# Patient Record
Sex: MALE | Race: WHITE | NOT HISPANIC OR LATINO | Employment: FULL TIME | URBAN - METROPOLITAN AREA
[De-identification: names, ages, dates, MRNs, and addresses within clinical notes are randomized per-mention and may not be internally consistent; named-entity substitution may affect disease eponyms.]

---

## 2017-04-03 ENCOUNTER — ALLSCRIPTS OFFICE VISIT (OUTPATIENT)
Dept: OTHER | Facility: OTHER | Age: 47
End: 2017-04-03

## 2017-09-11 ENCOUNTER — ALLSCRIPTS OFFICE VISIT (OUTPATIENT)
Dept: OTHER | Facility: OTHER | Age: 47
End: 2017-09-11

## 2017-12-13 ENCOUNTER — ALLSCRIPTS OFFICE VISIT (OUTPATIENT)
Dept: OTHER | Facility: OTHER | Age: 47
End: 2017-12-13

## 2017-12-15 ENCOUNTER — ALLSCRIPTS OFFICE VISIT (OUTPATIENT)
Dept: OTHER | Facility: OTHER | Age: 47
End: 2017-12-15

## 2017-12-18 NOTE — PROGRESS NOTES
Assessment  1  Periorbital cellulitis of left eye (682 0) (L03 213)   2  Hordeolum externum of left eye (373 11) (H00 016)    Plan   Hordeolum externum of left eye, Hordeolum externum of left eye, Periorbital cellulitis ofleft eye, PMH: Periorbital cellulitis of right eye    · SNELLEN VISION- POC; Status:Complete;   Done: [id number] 10:42AM  Hordeolum externum of left eye, Periorbital cellulitis of left eye    · Amoxicillin-Pot Clavulanate 875-125 MG Oral Tablet; TAKE 1 TABLET EVERY 12HOURS DAILY   · Tobramycin 0 3 % Ophthalmic Solution; INSTILL 2 DROP 4 times daily  Periorbital cellulitis of right eye (682 0) (L03 211)     Periorbital cellulitis of left eye (682 0) (L03 213)     Hordeolum externum of left eye (373 11) (H00 016)     Hordeolum externum of left eye (373 11) (H00 016)       Discussion/Summary    L eye hordeolum with cellulitis  Be sure to wash all towels and bedding to prevent reinfection    Demonstrated and discussed how to use warm compresses every 4-6 hours which can help with resolution of symptom and provide relief  Start abx drops and oral abx as directed  Take NSAIDs as directed  [address] [place] [address] follow up with me on Friday (2 days)  Discussed need for immediate follow up if any change in vision develops  Call the office if symptoms worsen or do not improve  The patient was counseled regarding diagnostic results,-- instructions for management,-- risk factor reductions,-- prognosis,-- patient and family education,-- impressions,-- risks and benefits of treatment options,-- importance of compliance with treatment  Possible side effects of new medications were reviewed with the patient/guardian today  The treatment plan was reviewed with the patient/guardian   The patient/guardian understands and agrees with the treatment plan      Chief Complaint  pt here c/o swelling under left eye, pt states it started on Monday with a little discomfort      History of Present Illness  HPI: 40 yo male for eval of L eyelid swelling  Hx of cellulitis to upper eye lid in the past a few years ago  Notes nothing he can remember causes this  No trauma to the area  No visual changes  Feels he is able to see fine  No pain over the area  Notes nothing is coming to a head (prior these would form heads)  Does not feel like anything is in his eyes  Notes that swelling causes pressure sensation in the area  Not pain  No glasses on contacts  Stye (Brief): The patient is being seen for an initial evaluation of a stye  Symptoms:  eyelid redness-- and-- eyelid crusting, but-- no eyelid pain-- and-- no foreign body sensation--   The patient presents with complaints of eyelid swelling (Swelling L lower eyelid, + swelling below eye  No pain with eye movement  )  Associated symptoms:  no blurred vision,-- no photophobia,-- no eye redness,-- no tearing,-- no purulent drainage,-- no eye pain-- and-- no fever  Review of Systems   Constitutional: no fever  Cardiovascular: no chest pain  Respiratory: no cough  Gastrointestinal: no nausea,-- no vomiting-- and-- no diarrhea  Integumentary: a rash  Active Problems   Periorbital cellulitis of right eye (682 0) (L03 211)     Abscess (682 9) (L02 91)     Cellulitis (682 9) (L03 90)     Poison ivy dermatitis (692 6) (L23 7)     Acute gastroenteritis (558 9) (K52 9)     Finger infection (686 9) (L08 9)     Skin rash (782 1) (R21)     Periorbital cellulitis of left eye (682 0) (A71 972)       Past Medical History  Active Problems And Past Medical History Reviewed: The active problems and past medical history were reviewed and updated today  Surgical History  Surgical History Reviewed: The surgical history was reviewed and updated today  Social History   · Being A Social Drinker   · Current Every Day Smoker (305 1)   · Denied: History of Drug Use  The social history was reviewed and updated today  Family History  Family History Reviewed:    The family history was reviewed and updated today        Current Meds   1  Triamcinolone Acetonide 0 1 % External Cream; APPLY  AND RUB  IN A THIN FILM TO AFFECTED AREAS TWICE DAILY  (AM AND PM) to areas on arm  (Do not use on face or sensative skin area); Therapy: 87BFK0756 to (Last Rx:[id number])  Requested for: 23Zhh0426 Ordered    The medication list was reviewed and updated today  Allergies  1  No Known Drug Allergies  2  No Known Environmental Allergies   3  No Known Food Allergies    Vitals   Recorded: 13Dec2017 09:59AM   Temperature 97 8 F, Oral   Heart Rate 86   Systolic 315, LUE, Sitting   Diastolic 84, LUE, Sitting   Height 6 ft 1 in   Weight 213 lb    BMI Calculated 28 1   BSA Calculated 2 21   O2 Saturation 97, RA     Physical Exam   Constitutional  General appearance: No acute distress, well appearing and well nourished  -- See photo  Eyes  Conjunctiva and lids: Abnormal  -- (+ hordeolum L lower lid laterally  + periorbital edema  Painless EOM  vision checked) Conjunctiva Findings: no hyperemia,-- no watery discharge,-- no purulent discharge,-- no subconjunctival hemorrhages-- and-- no increase in tearing  Eye Lids: left lower eyelid swelling-- and-- left lower eyelid blepharitis, but-- no lid lag on the left,-- left lacrimal gland not enlarged,-- no purulent discharge from the left lacrimal gland-- and-- no left upper eyelid blepharitis  Pupils and irises: Abnormal   Pupils: no proptosis-- and-- equal, round, and reactive to light bilaterally  Cornea, Lens, and Sclera: Bilateral eyes: sclera was not yellow  Right eye: no corneal foreign body-- and-- no corneal ulceration  Left eye: no corneal foreign body-- and-- no corneal ulceration  + right sided periorbital edema below eye on L side  -- PERRL, painless EOMI  Ears, Nose, Mouth, and Throat  Otoscopic examination: Tympanic membrance translucent with normal light reflex  Canals patent without erythema  Oropharynx: Normal with no erythema, edema, exudate or lesions  -- MMM normal pharynx  Pulmonary  Respiratory effort: No increased work of breathing or signs of respiratory distress  Auscultation of lungs: Clear to auscultation, equal breath sounds bilaterally, no wheezes, no rales, no rhonci  Cardiovascular  Auscultation of heart: Normal rate and rhythm, normal S1 and S2, without murmurs  Abdomen soft  Skin + lesion right temporal region  + induration  + discharge (culture taken)  + tenderness to light pressure  EOMI eyes without pain          Results/Data  SNELLEN VISION- POC [id number] 10:42AM Josh Siddiqui     Test Name Result Flag Reference   Right Eye 20/25 uncorrected     Left Eye 20/25 uncorrected     Bilateral Eyes 20/25 uncorrected         Procedure             Procedure:  Results: 20/25 in both eyes without corrective device,-- 20/25 in the right eye without corrective device,-- 20/25 in the left eye without corrective device          Signatures   Electronically signed by : Veto Sanchez, [facility]; Dec 13 2017 12:52PM EST                       (Author)    Electronically signed by : Yasir Cuenca MD; Dec 17 2017  9:41PM EST                       (Validation)

## 2018-01-12 NOTE — MISCELLANEOUS
Message  Return to work or school:   Yvrose Robb is under my professional care   He was seen in my office on 09/11/2017   He is able to return to work on  09/18/2017            Signatures   Electronically signed by : Mariluz Bales DO; Sep 12 2017  9:56AM EST                       (Author)

## 2018-01-14 VITALS
TEMPERATURE: 98.8 F | HEIGHT: 74 IN | HEART RATE: 82 BPM | OXYGEN SATURATION: 98 % | WEIGHT: 214 LBS | BODY MASS INDEX: 27.46 KG/M2 | SYSTOLIC BLOOD PRESSURE: 110 MMHG | DIASTOLIC BLOOD PRESSURE: 72 MMHG

## 2018-01-14 VITALS
SYSTOLIC BLOOD PRESSURE: 118 MMHG | BODY MASS INDEX: 27.13 KG/M2 | DIASTOLIC BLOOD PRESSURE: 78 MMHG | HEIGHT: 74 IN | HEART RATE: 84 BPM | OXYGEN SATURATION: 98 % | WEIGHT: 211.38 LBS | TEMPERATURE: 98.8 F

## 2018-01-23 VITALS
WEIGHT: 213 LBS | TEMPERATURE: 97.8 F | DIASTOLIC BLOOD PRESSURE: 84 MMHG | OXYGEN SATURATION: 97 % | HEART RATE: 86 BPM | BODY MASS INDEX: 28.23 KG/M2 | HEIGHT: 73 IN | SYSTOLIC BLOOD PRESSURE: 118 MMHG

## 2018-01-23 VITALS
BODY MASS INDEX: 28.23 KG/M2 | HEART RATE: 88 BPM | OXYGEN SATURATION: 99 % | HEIGHT: 73 IN | WEIGHT: 213 LBS | SYSTOLIC BLOOD PRESSURE: 118 MMHG | DIASTOLIC BLOOD PRESSURE: 74 MMHG | TEMPERATURE: 97.6 F

## 2018-01-23 NOTE — MISCELLANEOUS
Message  Return to work or school:   Silvana Lam is under my professional care   He was seen in my office on 12/13/2017   He is able to return to work on  12/15/2017            Signatures   Electronically signed by : Nina Barrientos, [facility]; Dec 13 2017  7:33PM EST                       (Author)

## 2018-01-23 NOTE — MISCELLANEOUS
Message  Return to work or school:   Rachid Loo is under my professional care  He was seen in my office on 12/15/2017   He is able to return to work on  12/18/2017      Patient has been under our care since December 13, 2017  He has been released to go back to work on December 18, 2017  Please give us a call if you need anything          Signatures   Electronically signed by : Guillermina Ramos, [address]; Dec 15 2017  5:50PM EST                       (Author)

## 2018-05-14 ENCOUNTER — OFFICE VISIT (OUTPATIENT)
Dept: INTERNAL MEDICINE CLINIC | Facility: CLINIC | Age: 48
End: 2018-05-14
Payer: COMMERCIAL

## 2018-05-14 VITALS
HEART RATE: 88 BPM | SYSTOLIC BLOOD PRESSURE: 122 MMHG | OXYGEN SATURATION: 97 % | DIASTOLIC BLOOD PRESSURE: 82 MMHG | HEIGHT: 73 IN | WEIGHT: 218 LBS | TEMPERATURE: 97.9 F | BODY MASS INDEX: 28.89 KG/M2

## 2018-05-14 DIAGNOSIS — R19.7 DIARRHEA, UNSPECIFIED TYPE: Primary | ICD-10-CM

## 2018-05-14 PROBLEM — L03.213 PERIORBITAL CELLULITIS OF LEFT EYE: Status: RESOLVED | Noted: 2017-09-11 | Resolved: 2018-05-14

## 2018-05-14 PROBLEM — R21 SKIN RASH: Status: ACTIVE | Noted: 2017-04-03

## 2018-05-14 PROBLEM — H00.016 HORDEOLUM EXTERNUM OF LEFT EYE: Status: ACTIVE | Noted: 2017-12-13

## 2018-05-14 PROBLEM — L25.5 RHUS DERMATITIS: Status: ACTIVE | Noted: 2017-12-15

## 2018-05-14 PROBLEM — L03.213 PERIORBITAL CELLULITIS OF LEFT EYE: Status: ACTIVE | Noted: 2017-09-11

## 2018-05-14 PROCEDURE — 99213 OFFICE O/P EST LOW 20 MIN: CPT | Performed by: NURSE PRACTITIONER

## 2018-05-14 NOTE — LETTER
May 14, 2018     Patient: Deepali Martinez   YOB: 1970   Date of Visit: 5/14/2018       To Whom it May Concern:    Hanny Draper is under my professional care  He was seen in my office on 5/14/2018  He may return to work on 5/15/2018  If you have any questions or concerns, please don't hesitate to call           Sincerely,          MATTHEW Cuadra        CC: No Recipients

## 2018-05-14 NOTE — PATIENT INSTRUCTIONS
As discussed, will likely clear on its own  Recommend a bland diet, bananas rice applesauce or toast  Plenty of fluids  If any new or worsening symptoms such as fever >100 4, worsening abdominal pain, worsening diarrhea or no improvement we would like to see you back

## 2018-05-14 NOTE — PROGRESS NOTES
Assessment/Plan:     Diagnoses and all orders for this visit:    Diarrhea, unspecified type    Advised patient, will likely clear on its own  Recommend a bland diet, bananas rice applesauce or toast  Plenty of fluids  If any new or worsening symptoms such as fever >100 4, worsening abdominal pain, worsening diarrhea or no improvement we would like to see you back  Subjective:      Patient ID: Ivan Villagomez is a 57 y o  male  HPI  Patient is here today c/o diarrhea x 12 hours  Symptoms include gas, diarrhea described as watery small amounts, mild abdominal cramping relieved by bowel movements  Pertinent negatives:  No fevers, chills or sweats  No blood or mucous in stool  No nausea or vomiting  Patient has not taken any medications for this  No new foods, travel or antibiotic use  Sick contacts include 5 yr old child with same symptoms x 2 days that have resolved  Able to tolerate fluids and food well  Patient is a  and could not work today due to lack of access to bathrooms  Requesting work note for today  The following portions of the patient's history were reviewed and updated as appropriate: allergies, current medications, past family history, past medical history, past social history, past surgical history and problem list    Review of Systems   Constitutional: Negative for activity change, appetite change, chills, diaphoresis, fatigue and fever  Gastrointestinal: Positive for abdominal pain and diarrhea  Negative for blood in stool, nausea and vomiting  Genitourinary: Negative for difficulty urinating and dysuria  Neurological: Negative for dizziness, syncope, light-headedness and headaches  Past Medical History:   Diagnosis Date    Back pain     Poison ivy        No current outpatient prescriptions on file  Allergies   Allergen Reactions    Poison Ivy Extract        Social History   History reviewed  No pertinent surgical history    Family History   Problem Relation Age of Onset    Diabetes Mother     Diabetes Father        Objective:  /82 (BP Location: Left arm, Patient Position: Sitting, Cuff Size: Adult)   Pulse 88   Temp 97 9 °F (36 6 °C) (Oral)   Ht 6' 1" (1 854 m)   Wt 98 9 kg (218 lb)   SpO2 97%   BMI 28 76 kg/m²      Physical Exam   Constitutional: He is oriented to person, place, and time  He appears well-developed and well-nourished  No distress  HENT:   Head: Normocephalic and atraumatic  Eyes: Conjunctivae and EOM are normal    Cardiovascular: Normal rate and normal heart sounds  No murmur heard  Pulmonary/Chest: Effort normal and breath sounds normal  No respiratory distress  He has no wheezes  Abdominal: Soft  Bowel sounds are normal  He exhibits no distension and no mass  There is no tenderness  There is no CVA tenderness  Musculoskeletal: He exhibits no edema  Neurological: He is alert and oriented to person, place, and time  Skin: Skin is warm and dry  Psychiatric: He has a normal mood and affect  His behavior is normal    Vitals reviewed

## 2018-11-12 ENCOUNTER — OFFICE VISIT (OUTPATIENT)
Dept: INTERNAL MEDICINE CLINIC | Age: 48
End: 2018-11-12
Payer: COMMERCIAL

## 2018-11-12 VITALS
DIASTOLIC BLOOD PRESSURE: 74 MMHG | SYSTOLIC BLOOD PRESSURE: 130 MMHG | HEIGHT: 73 IN | OXYGEN SATURATION: 98 % | WEIGHT: 218 LBS | TEMPERATURE: 97.2 F | HEART RATE: 76 BPM | BODY MASS INDEX: 28.89 KG/M2

## 2018-11-12 DIAGNOSIS — M54.50 ACUTE BILATERAL LOW BACK PAIN WITHOUT SCIATICA: Primary | ICD-10-CM

## 2018-11-12 PROCEDURE — 3008F BODY MASS INDEX DOCD: CPT | Performed by: INTERNAL MEDICINE

## 2018-11-12 PROCEDURE — 99213 OFFICE O/P EST LOW 20 MIN: CPT | Performed by: INTERNAL MEDICINE

## 2018-11-12 RX ORDER — CYCLOBENZAPRINE HCL 5 MG
TABLET ORAL
Qty: 30 TABLET | Refills: 0 | Status: SHIPPED | OUTPATIENT
Start: 2018-11-12 | End: 2019-02-21 | Stop reason: SDUPTHER

## 2018-11-12 NOTE — ASSESSMENT & PLAN NOTE
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

## 2018-11-12 NOTE — PROGRESS NOTES
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 while driving and to frequently stop to stretch  Diagnoses and all orders for this visit:    Acute bilateral low back pain without sciatica          Subjective:      Patient ID: Faith Torres is a 57 y o  male  80-year-old male is seen today with acute low back pain of 1 day duration  He was moving some wood around yesterday and had back pain since  Denies any sciatica or radiculopathy and is able to control his bowel and bladder  He also admits to lower back muscle spasm  Has taken Flexeril in the past for muscle back spasms  Back Pain   This is a new problem  The current episode started yesterday  The problem occurs daily  The problem is unchanged  The pain is present in the lumbar spine  The quality of the pain is described as cramping  The pain does not radiate  The pain is moderate  The symptoms are aggravated by twisting and bending  Pertinent negatives include no abdominal pain, bladder incontinence, bowel incontinence, chest pain, dysuria, fever, headaches, leg pain, numbness, paresis, paresthesias, pelvic pain, perianal numbness, tingling, weakness or weight loss  He has tried nothing for the symptoms         The following portions of the patient's history were reviewed and updated as appropriate: allergies, current medications, past family history, past medical history, past social history, past surgical history and problem list     Review of Systems   Constitutional: Negative for activity change, appetite change, chills, diaphoresis, fatigue, fever and weight loss  HENT: Negative for congestion, postnasal drip, rhinorrhea, sinus pain, sinus pressure, sneezing and sore throat  Eyes: Negative for visual disturbance  Respiratory: Negative for apnea, cough, choking, chest tightness, shortness of breath and wheezing  Cardiovascular: Negative for chest pain, palpitations and leg swelling  Gastrointestinal: Negative for abdominal distention, abdominal pain, anal bleeding, blood in stool, bowel incontinence, constipation, diarrhea, nausea and vomiting  Endocrine: Negative for cold intolerance and heat intolerance  Genitourinary: Negative for bladder incontinence, difficulty urinating, dysuria, hematuria and pelvic pain  Musculoskeletal: Positive for back pain  Skin: Negative  Neurological: Negative for dizziness, tingling, weakness, light-headedness, numbness, headaches and paresthesias  Hematological: Negative for adenopathy  Psychiatric/Behavioral: Negative for agitation, sleep disturbance and suicidal ideas  All other systems reviewed and are negative  Past Medical History:   Diagnosis Date    Back pain     Poison ivy        No current outpatient prescriptions on file  Allergies   Allergen Reactions    Poison Ivy Extract        Social History   History reviewed  No pertinent surgical history  Family History   Problem Relation Age of Onset    Diabetes Mother     Diabetes Father        Objective:  /74 (BP Location: Left arm, Patient Position: Sitting, Cuff Size: Adult)   Pulse 76   Temp (!) 97 2 °F (36 2 °C) (Tympanic)   Ht 6' 1 43" (1 865 m)   Wt 98 9 kg (218 lb)   SpO2 98%   BMI 28 43 kg/m²     No results found for this or any previous visit (from the past 1344 hour(s))  Physical Exam   Constitutional: He is oriented to person, place, and time  He appears well-developed and well-nourished  No distress  HENT:   Head: Normocephalic and atraumatic     Eyes: Pupils are equal, round, and reactive to light  Conjunctivae and EOM are normal  Right eye exhibits no discharge  Left eye exhibits no discharge  No scleral icterus  Neck: Normal range of motion  Neck supple  No JVD present  No thyromegaly present  Cardiovascular: Normal rate, regular rhythm, normal heart sounds and intact distal pulses  Exam reveals no gallop and no friction rub  No murmur heard  Pulmonary/Chest: Effort normal and breath sounds normal  No respiratory distress  He has no wheezes  He has no rales  He exhibits no tenderness  Abdominal: Soft  Bowel sounds are normal  He exhibits no distension and no mass  There is no tenderness  There is no rebound and no guarding  Musculoskeletal: Normal range of motion  He exhibits no edema, tenderness or deformity  Lumbar back: He exhibits spasm  He exhibits normal range of motion, no tenderness, no bony tenderness, no swelling, no edema, no deformity, no laceration, no pain and normal pulse  Lymphadenopathy:     He has no cervical adenopathy  Neurological: He is alert and oriented to person, place, and time  He has normal reflexes  No cranial nerve deficit  Coordination normal    Skin: Skin is warm and dry  No rash noted  He is not diaphoretic  No erythema  No pallor  Psychiatric: He has a normal mood and affect  His behavior is normal  Judgment and thought content normal    Nursing note and vitals reviewed

## 2018-11-12 NOTE — LETTER
November 12, 2018     Patient: Amira You   YOB: 1970   Date of Visit: 11/12/2018       To Whom it May Concern:    Donovan De Souza is under my professional care  He was seen in my office on 11/12/2018  He may return to work on 11/13/2018  If you have any questions or concerns, please don't hesitate to call           Sincerely,          Ana Mcnally MD        CC: No Recipients

## 2018-12-10 ENCOUNTER — OFFICE VISIT (OUTPATIENT)
Dept: INTERNAL MEDICINE CLINIC | Facility: CLINIC | Age: 48
End: 2018-12-10
Payer: COMMERCIAL

## 2018-12-10 VITALS
SYSTOLIC BLOOD PRESSURE: 126 MMHG | TEMPERATURE: 98.2 F | DIASTOLIC BLOOD PRESSURE: 72 MMHG | OXYGEN SATURATION: 97 % | HEIGHT: 73 IN | HEART RATE: 87 BPM | WEIGHT: 216.4 LBS | BODY MASS INDEX: 28.68 KG/M2

## 2018-12-10 DIAGNOSIS — M25.561 ACUTE PAIN OF RIGHT KNEE: Primary | ICD-10-CM

## 2018-12-10 PROCEDURE — 99213 OFFICE O/P EST LOW 20 MIN: CPT | Performed by: NURSE PRACTITIONER

## 2018-12-10 PROCEDURE — 3008F BODY MASS INDEX DOCD: CPT | Performed by: NURSE PRACTITIONER

## 2018-12-10 NOTE — PATIENT INSTRUCTIONS
Ice knee 20 minutes every hour  Elevate knee  Can continue to wear knee brace if that is helping  No excessive bending or twisting, rest knee as much as possible  Recommend checking an xray if not improved in 1 week  Pain should continue to improve daily, if not please notify the office

## 2018-12-10 NOTE — PROGRESS NOTES
MEDICAL STUDENT  Inpatient Progress Note for TRAINING ONLY  Not Part of Legal Medical Record       Progress Note - Alfreda Ramirez 57 y o  male MRN: [id number]    Unit/Bed#:  Encounter: [id number]      Assessment:  ***    Plan:  ***    Subjective:   ***    Objective:     Vitals: There were no vitals taken for this visit  ,There is no height or weight on file to calculate BMI      [unfilled]    Physical Exam: {Exam, Complete:80096}     Invasive Devices          No matching active lines, drains, or airways          Lab, Imaging and other studies: {Results Review Statement:36251}  VTE Pharmacologic Prophylaxis: {Pharmacologic VTE Prophylaxis:047860452}  VTE Mechanical Prophylaxis: {Mechanical VTE Prophylaxis:38627}

## 2018-12-10 NOTE — PROGRESS NOTES
Assessment/Plan:     Diagnoses and all orders for this visit:    Acute pain of right knee  Advised to ice knee 20 min per hour  Elevated, rest and continue with knee brace as needed  Patient in minimal 3/10 pain only with palpation, knee exam essentially normal, will hold on imagining at this time  Advised him that pain should be improving each day, if not or worsening symptoms he should contact our office  If not resolved x 1 week we will check xray  He is able to return to work tomorrow as he drove here today and states he had no problem with driving  He has no impairment to his range of motion  Strength is intact  I see no reason why the tenderness of his knee would impair his driving at this time  Patient agrees and would like to go back to work tomorrow  Advised pt to follow up in 1 month for routine visit and to check baseline labs     Subjective:      Patient ID: Amira You is a 57 y o  male  Patient presents with right knee pain  Patient was in his garage and stepped on a board that swung up and hit the medial side of his right knee  He states it was painful when it occurred but he was able to walk on his right leg  He is wearing a knee brace that he had from a past injury and feels it helps  Denies trying medications or icing the area  He states the pain is only with palpation and denies pain with walking or at rest   Denies swelling, fever, or numbness/tingling of leg or toes  Patient is a  so wants to be cleared for work  Patient reports he was able to drive safely here today  He has no impairment to his range of motion  Knee Pain    The incident occurred 12 to 24 hours ago  The incident occurred at home  The injury mechanism was a direct blow (stepped on a board wrong in his garage and the piece of wood came up and hit him in the knee)  The pain is present in the right knee  The pain is at a severity of 3/10 (with palpation  0/10 without )  The pain is mild   Pertinent negatives include no inability to bear weight, loss of motion, loss of sensation, muscle weakness, numbness or tingling  The symptoms are aggravated by palpation  He has tried elevation (knee brace) for the symptoms  The treatment provided moderate relief  The following portions of the patient's history were reviewed and updated as appropriate: allergies, current medications, past family history, past medical history, past social history, past surgical history and problem list     Past Medical History:   Diagnosis Date    Back pain     Poison ivy      History reviewed  No pertinent surgical history  Current Outpatient Prescriptions:     cyclobenzaprine (FLEXERIL) 5 mg tablet, Take 1-2 tablets at bedtime as needed for muscle spasms  Do not take while operating heavy machinery or exercising , Disp: 30 tablet, Rfl: 0    Review of Systems   Constitutional: Negative for chills and fever  Musculoskeletal: Positive for arthralgias (right knee)  Negative for gait problem and myalgias  Skin: Negative for wound  Neurological: Negative for tingling and numbness  Objective:  /72 (BP Location: Left arm, Patient Position: Sitting, Cuff Size: Large)   Pulse 87   Temp 98 2 °F (36 8 °C) (Oral)   Ht 6' 1 25" (1 861 m)   Wt 98 2 kg (216 lb 6 4 oz)   SpO2 97%   BMI 28 36 kg/m²          Physical Exam   Constitutional: He is oriented to person, place, and time  He appears well-developed and well-nourished  No distress  HENT:   Head: Normocephalic and atraumatic  Eyes: Pupils are equal, round, and reactive to light  Conjunctivae and EOM are normal    Cardiovascular: Normal rate, regular rhythm and normal heart sounds  No murmur heard  Pulmonary/Chest: Effort normal and breath sounds normal  No respiratory distress  He has no wheezes  Musculoskeletal:        Right knee: He exhibits bony tenderness   He exhibits normal range of motion, no swelling, no effusion, no ecchymosis, no deformity, no laceration, no erythema, normal alignment, no LCL laxity, normal patellar mobility, normal meniscus and no MCL laxity  Tenderness found  No medial joint line, no lateral joint line, no MCL, no LCL and no patellar tendon tenderness noted  Legs:  Neurological: He is alert and oriented to person, place, and time  Skin: Skin is warm and dry  He is not diaphoretic  No erythema  Psychiatric: He has a normal mood and affect  His behavior is normal    Vitals reviewed

## 2018-12-10 NOTE — LETTER
December 10, 2018     Patient: Barbara Madden   YOB: 1970   Date of Visit: 12/10/2018       To Whom it May Concern:    Edward Rojas is under my professional care  He was seen in my office on 12/10/2018  He may return to work on 12/11/2018  If you have any questions or concerns, please don't hesitate to call           Sincerely,          MATTHEW Ford        CC: No Recipients

## 2019-02-21 ENCOUNTER — OFFICE VISIT (OUTPATIENT)
Dept: INTERNAL MEDICINE CLINIC | Facility: CLINIC | Age: 49
End: 2019-02-21
Payer: COMMERCIAL

## 2019-02-21 VITALS
DIASTOLIC BLOOD PRESSURE: 90 MMHG | WEIGHT: 216 LBS | TEMPERATURE: 98 F | SYSTOLIC BLOOD PRESSURE: 136 MMHG | OXYGEN SATURATION: 98 % | HEART RATE: 84 BPM | HEIGHT: 73 IN | BODY MASS INDEX: 28.63 KG/M2

## 2019-02-21 DIAGNOSIS — Z23 NEED FOR INFLUENZA VACCINATION: Primary | ICD-10-CM

## 2019-02-21 DIAGNOSIS — M54.50 ACUTE BILATERAL LOW BACK PAIN WITHOUT SCIATICA: ICD-10-CM

## 2019-02-21 PROCEDURE — 99213 OFFICE O/P EST LOW 20 MIN: CPT | Performed by: INTERNAL MEDICINE

## 2019-02-21 PROCEDURE — 3008F BODY MASS INDEX DOCD: CPT | Performed by: INTERNAL MEDICINE

## 2019-02-21 RX ORDER — MELOXICAM 7.5 MG/1
TABLET ORAL
Qty: 30 TABLET | Refills: 1 | Status: SHIPPED | OUTPATIENT
Start: 2019-02-21 | End: 2020-01-20 | Stop reason: ALTCHOICE

## 2019-02-21 RX ORDER — CYCLOBENZAPRINE HCL 5 MG
TABLET ORAL
Qty: 30 TABLET | Refills: 1 | Status: SHIPPED | OUTPATIENT
Start: 2019-02-21 | End: 2020-05-14

## 2019-02-21 NOTE — ASSESSMENT & PLAN NOTE
Will treat with cyclobenzaprine 5-10 mg BID PRN for muscle spasms and Meloxicam 7 5-15 mg daily PRN for mild to moderate pain  Defer on imaging and referral to PT at this time  He is to contact our office for worsening symptoms

## 2019-02-21 NOTE — LETTER
February 21, 2019     Patient: Amira You   YOB: 1970   Date of Visit: 2/21/2019       To Whom it May Concern:    Donovan De Souza is under my professional care  He was seen in my office on 2/21/2019  He may return to work on 2/25/2019  If you have any questions or concerns, please don't hesitate to call           Sincerely,          Ana Mcnally MD        CC: No Recipients

## 2019-02-21 NOTE — PROGRESS NOTES
Assessment/Plan:    Acute bilateral low back pain without sciatica  Will treat with cyclobenzaprine 5-10 mg BID PRN for muscle spasms and Meloxicam 7 5-15 mg daily PRN for mild to moderate pain  Defer on imaging and referral to PT at this time  He is to contact our office for worsening symptoms  Diagnoses and all orders for this visit:    Acute bilateral low back pain without sciatica  -     cyclobenzaprine (FLEXERIL) 5 mg tablet; Take 1-2 tablets at twice a day as needed for muscle spasms  Do not take while operating heavy machinery or exercising   -     meloxicam (MOBIC) 7 5 mg tablet; Take 1-2 tablets daily as needed for mild to moderate pain  Subjective:      Patient ID: Lois Ramos is a 57 y o  male  57year old male is seen today with acute low back pain after he slipped on ice this morning  He denies falling or any trauma, but since has been experiencing bilateral low back pain without radiation or sciatica  Back Pain   This is a new problem  The current episode started today  The problem occurs constantly  The problem is unchanged  The pain is present in the lumbar spine  The pain does not radiate  The pain is at a severity of 5/10  The pain is moderate  The symptoms are aggravated by bending, twisting and position  Pertinent negatives include no abdominal pain, bladder incontinence, bowel incontinence, chest pain, dysuria, fever, headaches, leg pain, numbness, paresis, paresthesias, pelvic pain, perianal numbness, tingling, weakness or weight loss  He has tried nothing for the symptoms  The following portions of the patient's history were reviewed and updated as appropriate: allergies, current medications, past family history, past medical history, past social history, past surgical history and problem list     Review of Systems   Constitutional: Negative for activity change, appetite change, chills, diaphoresis, fatigue, fever and weight loss     HENT: Negative for congestion, postnasal drip, rhinorrhea, sinus pressure, sinus pain, sneezing and sore throat  Eyes: Negative for visual disturbance  Respiratory: Negative for apnea, cough, choking, chest tightness, shortness of breath and wheezing  Cardiovascular: Negative for chest pain, palpitations and leg swelling  Gastrointestinal: Negative for abdominal distention, abdominal pain, anal bleeding, blood in stool, bowel incontinence, constipation, diarrhea, nausea and vomiting  Endocrine: Negative for cold intolerance and heat intolerance  Genitourinary: Negative for bladder incontinence, difficulty urinating, dysuria, hematuria and pelvic pain  Musculoskeletal: Positive for back pain  Skin: Negative  Neurological: Negative for dizziness, tingling, weakness, light-headedness, numbness, headaches and paresthesias  Hematological: Negative for adenopathy  Psychiatric/Behavioral: Negative for agitation, sleep disturbance and suicidal ideas  All other systems reviewed and are negative  Past Medical History:   Diagnosis Date    Back pain     Poison ivy          Current Outpatient Medications:     cyclobenzaprine (FLEXERIL) 5 mg tablet, Take 1-2 tablets at twice a day as needed for muscle spasms  Do not take while operating heavy machinery or exercising , Disp: 30 tablet, Rfl: 1    meloxicam (MOBIC) 7 5 mg tablet, Take 1-2 tablets daily as needed for mild to moderate pain , Disp: 30 tablet, Rfl: 1    Allergies   Allergen Reactions    Poison Ivy Extract        Social History   History reviewed  No pertinent surgical history    Family History   Problem Relation Age of Onset    Diabetes Mother     Diabetes Father        Objective:  /90 (BP Location: Left arm, Patient Position: Sitting, Cuff Size: Adult)   Pulse 84   Temp 98 °F (36 7 °C) (Oral)   Ht 6' 1" (1 854 m) Comment: with boots on  Wt 98 kg (216 lb) Comment: with boots on  SpO2 98% Comment: room air  BMI 28 50 kg/m²     No results found for this or any previous visit (from the past 1344 hour(s))  Physical Exam   Constitutional: He is oriented to person, place, and time  He appears well-developed and well-nourished  No distress  HENT:   Head: Normocephalic and atraumatic  Eyes: Pupils are equal, round, and reactive to light  Conjunctivae and EOM are normal  Right eye exhibits no discharge  Left eye exhibits no discharge  No scleral icterus  Neck: Normal range of motion  Neck supple  No JVD present  No thyromegaly present  Cardiovascular: Normal rate, regular rhythm, normal heart sounds and intact distal pulses  Exam reveals no gallop and no friction rub  No murmur heard  Pulmonary/Chest: Effort normal and breath sounds normal  No respiratory distress  He has no wheezes  He has no rales  He exhibits no tenderness  Abdominal: Soft  Bowel sounds are normal  He exhibits no distension and no mass  There is no tenderness  There is no rebound and no guarding  Musculoskeletal: He exhibits no edema or deformity  Lumbar back: He exhibits decreased range of motion, tenderness, pain and spasm  He exhibits no bony tenderness, no swelling, no edema, no deformity, no laceration and normal pulse  Lymphadenopathy:     He has no cervical adenopathy  Neurological: He is alert and oriented to person, place, and time  He has normal reflexes  No cranial nerve deficit  Coordination normal    Skin: Skin is warm and dry  No rash noted  He is not diaphoretic  No erythema  No pallor  Psychiatric: He has a normal mood and affect  His behavior is normal  Judgment and thought content normal    Nursing note and vitals reviewed

## 2020-01-20 ENCOUNTER — OFFICE VISIT (OUTPATIENT)
Dept: INTERNAL MEDICINE CLINIC | Facility: CLINIC | Age: 50
End: 2020-01-20
Payer: COMMERCIAL

## 2020-01-20 VITALS
WEIGHT: 212.2 LBS | SYSTOLIC BLOOD PRESSURE: 136 MMHG | BODY MASS INDEX: 28.12 KG/M2 | HEIGHT: 73 IN | TEMPERATURE: 98.1 F | OXYGEN SATURATION: 97 % | HEART RATE: 85 BPM | DIASTOLIC BLOOD PRESSURE: 78 MMHG

## 2020-01-20 DIAGNOSIS — J01.00 ACUTE NON-RECURRENT MAXILLARY SINUSITIS: Primary | ICD-10-CM

## 2020-01-20 PROBLEM — M25.561 ACUTE PAIN OF RIGHT KNEE: Status: RESOLVED | Noted: 2018-12-10 | Resolved: 2020-01-20

## 2020-01-20 PROBLEM — R19.7 DIARRHEA: Status: RESOLVED | Noted: 2018-05-14 | Resolved: 2020-01-20

## 2020-01-20 PROCEDURE — 99213 OFFICE O/P EST LOW 20 MIN: CPT | Performed by: INTERNAL MEDICINE

## 2020-01-20 NOTE — ASSESSMENT & PLAN NOTE
Defer on antibiotics at this time  Will try over-the-counter medications for symptom relief, second-generation antihistamine and decongestant  He is to contact office for worsening symptoms at which if symptoms worsen, will prescribe azithromycin

## 2020-01-20 NOTE — PROGRESS NOTES
Assessment/Plan:    Acute non-recurrent maxillary sinusitis  Defer on antibiotics at this time  Will try over-the-counter medications for symptom relief, second-generation antihistamine and decongestant  He is to contact office for worsening symptoms at which if symptoms worsen, will prescribe azithromycin  Diagnoses and all orders for this visit:    Acute non-recurrent maxillary sinusitis          BMI Counseling: Body mass index is 28 kg/m²  The BMI is above normal  Nutrition recommendations include decreasing portion sizes, encouraging healthy choices of fruits and vegetables, decreasing fast food intake, consuming healthier snacks, limiting drinks that contain sugar, moderation in carbohydrate intake, increasing intake of lean protein, reducing intake of saturated and trans fat and reducing intake of cholesterol  Exercise recommendations include moderate physical activity 150 minutes/week and exercising 3-5 times per week  No pharmacotherapy was ordered  Patient referred to PCP due to patient being overweight  Tobacco Cessation Counseling: Tobacco cessation counseling was provided  The patient is sincerely urged to quit consumption of tobacco  He is not ready to quit tobacco  Medication options and side effects of medication discussed  Patient refused medication  Time spent during encounter: 15 minutes (counseling, reviewing medications, and discussing treatment and plan)    Subjective:      Patient ID: Tomas Guzman is a 54 y o  male  Chief Complaint   Patient presents with    Cold Like Symptoms     Pt reports for approx 1 week he's had a runny nose, some post nasal drip and sinus congestion when lying down   Health Maint     BMI f/u, Annual Physical and HIV screen is due       51-year-old male is seen today with acute sinusitis since 1 week  He admits to recent sick contacts, children  Sinusitis   This is a new problem  The current episode started 1 to 4 weeks ago   The problem has been gradually worsening since onset  There has been no fever  Associated symptoms include congestion, coughing (mildly productive) and sinus pressure  Pertinent negatives include no chills, diaphoresis, ear pain, headaches, hoarse voice, neck pain, shortness of breath, sneezing, sore throat or swollen glands  Past treatments include nothing  The following portions of the patient's history were reviewed and updated as appropriate: allergies, current medications, past family history, past medical history, past social history, past surgical history and problem list     Review of Systems   Constitutional: Negative for activity change, appetite change, chills, diaphoresis, fatigue and fever  HENT: Positive for congestion, postnasal drip, rhinorrhea and sinus pressure  Negative for ear pain, hoarse voice, sinus pain, sneezing and sore throat  Eyes: Negative for visual disturbance  Respiratory: Positive for cough (mildly productive)  Negative for apnea, choking, chest tightness, shortness of breath and wheezing  Cardiovascular: Negative for chest pain, palpitations and leg swelling  Gastrointestinal: Negative for abdominal distention, abdominal pain, anal bleeding, blood in stool, constipation, diarrhea, nausea and vomiting  Endocrine: Negative for cold intolerance and heat intolerance  Genitourinary: Negative for difficulty urinating, dysuria and hematuria  Musculoskeletal: Negative  Negative for neck pain  Skin: Negative  Neurological: Negative for dizziness, weakness, light-headedness, numbness and headaches  Hematological: Negative for adenopathy  Psychiatric/Behavioral: Negative for agitation, sleep disturbance and suicidal ideas  All other systems reviewed and are negative          Past Medical History:   Diagnosis Date    Back pain     Poison ivy          Current Outpatient Medications:     cyclobenzaprine (FLEXERIL) 5 mg tablet, Take 1-2 tablets at twice a day as needed for muscle spasms  Do not take while operating heavy machinery or exercising , Disp: 30 tablet, Rfl: 1    Allergies   Allergen Reactions    Poison Ivy Extract        Social History   History reviewed  No pertinent surgical history  Family History   Problem Relation Age of Onset    Diabetes Mother     Diabetes Father        Objective:  /78 (BP Location: Left arm, Patient Position: Sitting, Cuff Size: Standard)   Pulse 85   Temp 98 1 °F (36 7 °C) (Oral)   Ht 6' 1" (1 854 m)   Wt 96 3 kg (212 lb 3 2 oz)   SpO2 97%   BMI 28 00 kg/m²     No results found for this or any previous visit (from the past 1344 hour(s))  Physical Exam   Constitutional: He is oriented to person, place, and time  He appears well-developed and well-nourished  No distress  HENT:   Head: Normocephalic and atraumatic  Eyes: Pupils are equal, round, and reactive to light  Conjunctivae and EOM are normal  Right eye exhibits no discharge  Left eye exhibits no discharge  No scleral icterus  Neck: Normal range of motion  Neck supple  No JVD present  No thyromegaly present  Cardiovascular: Normal rate, regular rhythm, normal heart sounds and intact distal pulses  Exam reveals no gallop and no friction rub  No murmur heard  Pulmonary/Chest: Effort normal and breath sounds normal  No respiratory distress  He has no wheezes  He has no rales  He exhibits no tenderness  Abdominal: Soft  Bowel sounds are normal  He exhibits no distension and no mass  There is no tenderness  There is no rebound and no guarding  Musculoskeletal: Normal range of motion  He exhibits no edema, tenderness or deformity  Lymphadenopathy:     He has no cervical adenopathy  Neurological: He is alert and oriented to person, place, and time  He has normal reflexes  No cranial nerve deficit  Coordination normal    Skin: Skin is warm and dry  No rash noted  He is not diaphoretic  No erythema  No pallor     Psychiatric: He has a normal mood and affect  His behavior is normal  Judgment and thought content normal    Nursing note and vitals reviewed

## 2020-01-20 NOTE — LETTER
January 20, 2020     Patient: Ricardo Souza   YOB: 1970   Date of Visit: 1/20/2020       To Whom it May Concern:    Trae Oakes is under my professional care  He was seen in my office on 1/20/2020  He may return to work on 01/21/2020  If you have any questions or concerns, please don't hesitate to call           Sincerely,          Svetlana Mcclain MD        CC: No Recipients

## 2020-03-09 ENCOUNTER — OFFICE VISIT (OUTPATIENT)
Dept: INTERNAL MEDICINE CLINIC | Age: 50
End: 2020-03-09
Payer: COMMERCIAL

## 2020-03-09 VITALS
HEIGHT: 74 IN | SYSTOLIC BLOOD PRESSURE: 106 MMHG | DIASTOLIC BLOOD PRESSURE: 64 MMHG | BODY MASS INDEX: 27.59 KG/M2 | HEART RATE: 86 BPM | TEMPERATURE: 98.1 F | WEIGHT: 215 LBS

## 2020-03-09 DIAGNOSIS — R68.89 FLU-LIKE SYMPTOMS: Primary | ICD-10-CM

## 2020-03-09 PROCEDURE — 3008F BODY MASS INDEX DOCD: CPT | Performed by: INTERNAL MEDICINE

## 2020-03-09 PROCEDURE — 3008F BODY MASS INDEX DOCD: CPT | Performed by: NURSE PRACTITIONER

## 2020-03-09 PROCEDURE — 99213 OFFICE O/P EST LOW 20 MIN: CPT | Performed by: NURSE PRACTITIONER

## 2020-03-09 PROCEDURE — 4004F PT TOBACCO SCREEN RCVD TLK: CPT | Performed by: NURSE PRACTITIONER

## 2020-03-09 RX ORDER — BENZONATATE 200 MG/1
200 CAPSULE ORAL 3 TIMES DAILY PRN
Qty: 20 CAPSULE | Refills: 0 | Status: SHIPPED | OUTPATIENT
Start: 2020-03-09 | End: 2020-05-14 | Stop reason: ALTCHOICE

## 2020-03-09 NOTE — PROGRESS NOTES
Assessment/Plan:    Flu-like symptoms  Illness appears to be viral in nature  Rest and fluids advised  Educated that the course of this illness could be 2-4 weeks  Discussed symptomatic relief, such as warm steam inhalations, tylenol/ibuprofen for fevers and body aches, rest, and drink plenty of fluids  Warm salt gargles for sore throat  Discussed red flag signs to go to the ER, such as chest pain or shortness of breath  Return to the office for reevaluation if symptoms worsen or do not improve in 1-2 weeks  Patient may start over the counter Mucinex, will send in albuterol and tessalon pearls  Diagnoses and all orders for this visit:    Flu-like symptoms          Subjective:      Patient ID: Geena Simmons is a 52 y o  male  Patient presents today with complaints of flu like symptoms  Patient reports that his symptoms started about 6 days ago with a sore throat  He report that he did not have the flu vaccination this year, and he was exposed to the flu  He has complaints of sinus congestion, fatigue, chills, productive cough, rhinorrhea and body aches  He denies symptoms of SOB, fever, and ear pain  Patient has been taking a decongestant with no relief  Patient is currently an active smoker        The following portions of the patient's history were reviewed and updated as appropriate: allergies, current medications, past family history, past medical history, past social history, past surgical history and problem list     Review of Systems   Constitutional: Positive for chills and fatigue  Negative for activity change, appetite change, diaphoresis and fever  HENT: Positive for rhinorrhea and sore throat  Negative for congestion, ear discharge, ear pain, postnasal drip, sinus pressure and sinus pain  Eyes: Negative for pain, discharge, itching and visual disturbance  Respiratory: Positive for cough (productive)   Negative for chest tightness, shortness of breath and wheezing  Cardiovascular: Negative for chest pain, palpitations and leg swelling  Gastrointestinal: Negative for abdominal pain, constipation, diarrhea, nausea and vomiting  Endocrine: Negative for polydipsia, polyphagia and polyuria  Genitourinary: Negative for difficulty urinating, dysuria and urgency  Musculoskeletal: Positive for myalgias  Negative for arthralgias, back pain and neck pain  Skin: Negative for rash and wound  Neurological: Negative for dizziness, weakness, numbness and headaches  Past Medical History:   Diagnosis Date    Back pain     Poison ivy          Current Outpatient Medications:     cyclobenzaprine (FLEXERIL) 5 mg tablet, Take 1-2 tablets at twice a day as needed for muscle spasms  Do not take while operating heavy machinery or exercising , Disp: 30 tablet, Rfl: 1    Allergies   Allergen Reactions    Poison Ivy Extract        Social History   No past surgical history on file  Family History   Problem Relation Age of Onset    Diabetes Mother     Diabetes Father        Objective:  /64   Pulse 86   Temp 98 1 °F (36 7 °C)   Ht 6' 1 82" (1 875 m)   Wt 97 5 kg (215 lb)   BMI 27 74 kg/m²     No results found for this or any previous visit (from the past 1344 hour(s))  Physical Exam   Constitutional: He is oriented to person, place, and time  He appears well-developed and well-nourished  No distress  HENT:   Head: Normocephalic and atraumatic  Right Ear: External ear normal  Tympanic membrane is bulging  Tympanic membrane is not erythematous  Left Ear: External ear normal  Tympanic membrane is bulging  Tympanic membrane is not erythematous  Nose: Mucosal edema and rhinorrhea present  Mouth/Throat: Mucous membranes are pale and dry  Posterior oropharyngeal erythema present  No oropharyngeal exudate or posterior oropharyngeal edema  Eyes: Pupils are equal, round, and reactive to light   Conjunctivae and EOM are normal  Right eye exhibits no discharge  Left eye exhibits no discharge  Neck: Normal range of motion  Neck supple  No thyromegaly present  Cardiovascular: Normal rate, regular rhythm, normal heart sounds and intact distal pulses  Exam reveals no gallop and no friction rub  No murmur heard  Pulmonary/Chest: Effort normal and breath sounds normal  No stridor  No respiratory distress  He has no wheezes  He has no rales  Abdominal: Soft  Bowel sounds are normal  He exhibits no distension  There is no tenderness  Lymphadenopathy:        Head (right side): Tonsillar adenopathy present  Head (left side): Tonsillar adenopathy present  He has no cervical adenopathy  Neurological: He is alert and oriented to person, place, and time  Skin: Skin is warm and dry  No rash noted  He is not diaphoretic  No erythema  Psychiatric: He has a normal mood and affect   His behavior is normal  Judgment and thought content normal

## 2020-03-09 NOTE — ASSESSMENT & PLAN NOTE
Illness appears to be viral in nature  Rest and fluids advised  Educated that the course of this illness could be 2-4 weeks  Discussed symptomatic relief, such as warm steam inhalations, tylenol/ibuprofen for fevers and body aches, rest, and drink plenty of fluids  Warm salt gargles for sore throat  Discussed red flag signs to go to the ER, such as chest pain or shortness of breath  Return to the office for reevaluation if symptoms worsen or do not improve in 1-2 weeks  Patient may start over the counter Mucinex, will send in albuterol and tessalon pearls

## 2020-03-11 ENCOUNTER — TELEPHONE (OUTPATIENT)
Dept: INTERNAL MEDICINE CLINIC | Facility: CLINIC | Age: 50
End: 2020-03-11

## 2020-03-11 NOTE — TELEPHONE ENCOUNTER
Symptoms can last 7-14 days, if he is still not feeling better may extend his work note, I would recommend 7 days off of work from the start of symptoms  If symptoms are worsen then he should be seen  Please write note if patient needs one

## 2020-03-11 NOTE — TELEPHONE ENCOUNTER
Patient was seen on Monday for flu like symptoms  He called and stated that he was supposed to go back to work today but the symptoms are still lingering on and he is not feeling any better  He would like to know if he should come in and be seen again or if we can extend his work note for him?

## 2020-03-25 ENCOUNTER — TELEMEDICINE (OUTPATIENT)
Dept: INTERNAL MEDICINE CLINIC | Facility: CLINIC | Age: 50
End: 2020-03-25
Payer: COMMERCIAL

## 2020-03-25 DIAGNOSIS — R05.9 COUGH: Primary | ICD-10-CM

## 2020-03-25 DIAGNOSIS — J00 ACUTE RHINITIS: ICD-10-CM

## 2020-03-25 PROCEDURE — 99213 OFFICE O/P EST LOW 20 MIN: CPT | Performed by: INTERNAL MEDICINE

## 2020-05-14 ENCOUNTER — OFFICE VISIT (OUTPATIENT)
Dept: INTERNAL MEDICINE CLINIC | Age: 50
End: 2020-05-14
Payer: COMMERCIAL

## 2020-05-14 VITALS
OXYGEN SATURATION: 97 % | DIASTOLIC BLOOD PRESSURE: 84 MMHG | TEMPERATURE: 98.9 F | WEIGHT: 213 LBS | HEART RATE: 96 BPM | SYSTOLIC BLOOD PRESSURE: 128 MMHG | BODY MASS INDEX: 27.48 KG/M2

## 2020-05-14 DIAGNOSIS — M54.9 ACUTE UPPER BACK PAIN: Primary | ICD-10-CM

## 2020-05-14 PROBLEM — R05.9 COUGH: Status: RESOLVED | Noted: 2020-03-25 | Resolved: 2020-05-14

## 2020-05-14 PROBLEM — J01.00 ACUTE NON-RECURRENT MAXILLARY SINUSITIS: Status: RESOLVED | Noted: 2020-01-20 | Resolved: 2020-05-14

## 2020-05-14 PROBLEM — L25.5 RHUS DERMATITIS: Status: RESOLVED | Noted: 2017-12-15 | Resolved: 2020-05-14

## 2020-05-14 PROBLEM — J00 ACUTE RHINITIS: Status: RESOLVED | Noted: 2020-03-25 | Resolved: 2020-05-14

## 2020-05-14 PROBLEM — M54.50 ACUTE BILATERAL LOW BACK PAIN WITHOUT SCIATICA: Status: RESOLVED | Noted: 2018-11-12 | Resolved: 2020-05-14

## 2020-05-14 PROBLEM — R21 SKIN RASH: Status: RESOLVED | Noted: 2017-04-03 | Resolved: 2020-05-14

## 2020-05-14 PROBLEM — H00.016 HORDEOLUM EXTERNUM OF LEFT EYE: Status: RESOLVED | Noted: 2017-12-13 | Resolved: 2020-05-14

## 2020-05-14 PROBLEM — R68.89 FLU-LIKE SYMPTOMS: Status: RESOLVED | Noted: 2020-03-09 | Resolved: 2020-05-14

## 2020-05-14 PROCEDURE — 99213 OFFICE O/P EST LOW 20 MIN: CPT | Performed by: INTERNAL MEDICINE

## 2020-05-14 PROCEDURE — 4004F PT TOBACCO SCREEN RCVD TLK: CPT | Performed by: INTERNAL MEDICINE

## 2020-05-14 RX ORDER — CYCLOBENZAPRINE HCL 10 MG
10 TABLET ORAL 3 TIMES DAILY PRN
Qty: 30 TABLET | Refills: 1 | Status: SHIPPED | OUTPATIENT
Start: 2020-05-14

## 2020-05-14 RX ORDER — MELOXICAM 15 MG/1
15 TABLET ORAL DAILY
Qty: 15 TABLET | Refills: 1 | Status: SHIPPED | OUTPATIENT
Start: 2020-05-14 | End: 2021-02-09

## 2020-10-12 ENCOUNTER — TELEMEDICINE (OUTPATIENT)
Dept: INTERNAL MEDICINE CLINIC | Facility: CLINIC | Age: 50
End: 2020-10-12
Payer: COMMERCIAL

## 2020-10-12 VITALS — BODY MASS INDEX: 28.1 KG/M2 | WEIGHT: 212 LBS | HEIGHT: 73 IN

## 2020-10-12 DIAGNOSIS — Z11.59 ENCOUNTER FOR SCREENING FOR OTHER VIRAL DISEASES: ICD-10-CM

## 2020-10-12 DIAGNOSIS — J01.00 ACUTE MAXILLARY SINUSITIS, RECURRENCE NOT SPECIFIED: ICD-10-CM

## 2020-10-12 DIAGNOSIS — Z11.59 ENCOUNTER FOR SCREENING FOR OTHER VIRAL DISEASES: Primary | ICD-10-CM

## 2020-10-12 PROCEDURE — U0003 INFECTIOUS AGENT DETECTION BY NUCLEIC ACID (DNA OR RNA); SEVERE ACUTE RESPIRATORY SYNDROME CORONAVIRUS 2 (SARS-COV-2) (CORONAVIRUS DISEASE [COVID-19]), AMPLIFIED PROBE TECHNIQUE, MAKING USE OF HIGH THROUGHPUT TECHNOLOGIES AS DESCRIBED BY CMS-2020-01-R: HCPCS | Performed by: NURSE PRACTITIONER

## 2020-10-12 PROCEDURE — 99213 OFFICE O/P EST LOW 20 MIN: CPT | Performed by: NURSE PRACTITIONER

## 2020-10-12 PROCEDURE — 3725F SCREEN DEPRESSION PERFORMED: CPT | Performed by: NURSE PRACTITIONER

## 2020-10-12 RX ORDER — AMOXICILLIN AND CLAVULANATE POTASSIUM 875; 125 MG/1; MG/1
1 TABLET, FILM COATED ORAL EVERY 12 HOURS SCHEDULED
Qty: 20 TABLET | Refills: 0 | Status: SHIPPED | OUTPATIENT
Start: 2020-10-12 | End: 2020-10-22

## 2020-10-13 LAB — SARS-COV-2 RNA SPEC QL NAA+PROBE: NOT DETECTED

## 2020-10-14 ENCOUNTER — TELEPHONE (OUTPATIENT)
Dept: INTERNAL MEDICINE CLINIC | Age: 50
End: 2020-10-14

## 2020-10-14 ENCOUNTER — TELEPHONE (OUTPATIENT)
Dept: INTERNAL MEDICINE CLINIC | Facility: CLINIC | Age: 50
End: 2020-10-14

## 2021-02-08 ENCOUNTER — HOSPITAL ENCOUNTER (OUTPATIENT)
Dept: RADIOLOGY | Facility: IMAGING CENTER | Age: 51
Discharge: HOME | End: 2021-02-08
Payer: COMMERCIAL

## 2021-02-08 ENCOUNTER — TELEPHONE (OUTPATIENT)
Dept: INTERNAL MEDICINE CLINIC | Facility: CLINIC | Age: 51
End: 2021-02-08

## 2021-02-08 ENCOUNTER — TELEPHONE (OUTPATIENT)
Dept: OBGYN CLINIC | Facility: HOSPITAL | Age: 51
End: 2021-02-08

## 2021-02-08 ENCOUNTER — OFFICE VISIT (OUTPATIENT)
Dept: INTERNAL MEDICINE CLINIC | Facility: CLINIC | Age: 51
End: 2021-02-08
Payer: COMMERCIAL

## 2021-02-08 VITALS
TEMPERATURE: 99.3 F | RESPIRATION RATE: 20 BRPM | SYSTOLIC BLOOD PRESSURE: 120 MMHG | OXYGEN SATURATION: 99 % | WEIGHT: 213 LBS | DIASTOLIC BLOOD PRESSURE: 62 MMHG | BODY MASS INDEX: 28.23 KG/M2 | HEIGHT: 73 IN | HEART RATE: 80 BPM

## 2021-02-08 DIAGNOSIS — R22.31 LOCALIZED SWELLING ON RIGHT HAND: Primary | ICD-10-CM

## 2021-02-08 DIAGNOSIS — R22.31 LOCALIZED SWELLING ON RIGHT HAND: ICD-10-CM

## 2021-02-08 DIAGNOSIS — S69.91XA HAND TRAUMA, RIGHT, INITIAL ENCOUNTER: ICD-10-CM

## 2021-02-08 DIAGNOSIS — S62.336A CLOSED DISPLACED FRACTURE OF NECK OF FIFTH METACARPAL BONE OF RIGHT HAND, INITIAL ENCOUNTER: Primary | ICD-10-CM

## 2021-02-08 PROCEDURE — 99213 OFFICE O/P EST LOW 20 MIN: CPT | Performed by: NURSE PRACTITIONER

## 2021-02-08 PROCEDURE — 73130 X-RAY EXAM OF HAND: CPT

## 2021-02-08 NOTE — TELEPHONE ENCOUNTER
Naun Troy from radiology called with significant findings for the xray of [facility] right hand

## 2021-02-08 NOTE — PROGRESS NOTES
Assessment/Plan:       Problem List Items Addressed This Visit        Other    Localized swelling on right hand - Primary     Will get XR of right hand to further assess injury  Will treat based on results  Relevant Orders    XR hand 3+ vw right    Hand trauma, right, initial encounter     S/p punching a wall, will get XR         Relevant Orders    XR hand 3+ vw right                 Subjective:      Patient ID: Rufina Rajput is a 52 y o  male  Patient presents for an acute visit  He reports that he got annoyed at something and punched a wall with his right hand for stress relief  He reports that his right lateral hand started to be sore after about an hour  He applied ice and noted that it was still swollen this morning  He is unable to work d/t being a  and his hand pain  Hand Pain   The incident occurred 12 to 24 hours ago  The incident occurred at home  The injury mechanism was a direct blow  The pain is present in the right fingers and right hand  The quality of the pain is described as stabbing  The pain does not radiate  The pain is moderate  The pain has been constant since the incident  Pertinent negatives include no chest pain, muscle weakness, numbness or tingling  The symptoms are aggravated by movement  He has tried ice for the symptoms  The treatment provided mild relief  The following portions of the patient's history were reviewed and updated as appropriate: allergies, current medications, past family history, past medical history, past social history, past surgical history and problem list     Review of Systems   Constitutional: Negative for chills and fever  HENT: Negative for trouble swallowing  Respiratory: Negative for shortness of breath and wheezing  Cardiovascular: Negative for chest pain and palpitations  Gastrointestinal: Negative for abdominal pain, diarrhea, nausea and vomiting  Genitourinary: Negative for difficulty urinating  Musculoskeletal:        Per hPI     Skin: Negative for rash  Neurological: Negative for dizziness, tingling and numbness  Psychiatric/Behavioral: Negative for agitation  The patient is not nervous/anxious  Past Medical History:   Diagnosis Date    Back pain     Poison ivy          Current Outpatient Medications:     Albuterol Sulfate (ProAir RespiClick) 134 (90 Base) MCG/ACT AEPB, Inhale 1 puff 4 (four) times a day as needed (wheezing or cough), Disp: 1 each, Rfl: 0    cyclobenzaprine (FLEXERIL) 10 mg tablet, Take 1 tablet (10 mg total) by mouth 3 (three) times a day as needed for muscle spasms, Disp: 30 tablet, Rfl: 1    meloxicam (MOBIC) 15 mg tablet, Take 1 tablet (15 mg total) by mouth daily, Disp: 15 tablet, Rfl: 1    Allergies   Allergen Reactions    Poison Ivy Extract        Social History   History reviewed  No pertinent surgical history  Family History   Problem Relation Age of Onset    Diabetes Mother     Diabetes Father        Objective:  /62 (BP Location: Left arm, Patient Position: Sitting, Cuff Size: Standard)   Pulse 80   Temp 99 3 °F (37 4 °C) (Tympanic)   Resp 20   Ht 6' 1" (1 854 m)   Wt 96 6 kg (213 lb)   SpO2 99%   BMI 28 10 kg/m²        Physical Exam  Constitutional:       General: He is not in acute distress  Appearance: He is well-developed  HENT:      Head: Normocephalic and atraumatic  Right Ear: External ear normal       Left Ear: External ear normal    Eyes:      General: No scleral icterus  Pupils: Pupils are equal, round, and reactive to light  Neck:      Musculoskeletal: Normal range of motion and neck supple  Cardiovascular:      Rate and Rhythm: Normal rate and regular rhythm  Pulmonary:      Effort: Pulmonary effort is normal       Breath sounds: Normal breath sounds  Abdominal:      General: Bowel sounds are normal  There is no distension  Palpations: Abdomen is soft     Musculoskeletal:         General: Swelling present  Right hand: He exhibits decreased range of motion, tenderness, deformity and swelling  He exhibits no bony tenderness, normal capillary refill and no laceration  Normal sensation noted  Decreased strength noted  He exhibits finger abduction, thumb/finger opposition and wrist extension trouble  Hands:    Skin:     General: Skin is warm  Neurological:      Mental Status: He is alert and oriented to person, place, and time     Psychiatric:         Behavior: Behavior normal

## 2021-02-08 NOTE — TELEPHONE ENCOUNTER
MRN: [id number]  Patient Name: Rometta Harada O B: 1970  Dept & Loc: Orth[facility]/ [facility]  Appt Notes: NP/ comminuted fracture of the neck of the 5th metacarpal with mild volar angulation/XRAY/[facility]  Visit Type: new  Provider Name: Annemarie Ramesh  Appointment Desired Day/Time: Providers discretion  Best # [phone]    Shea Galvan" Would  Patient   [facility]  [email or web address]  [phone]  [phone]       sent via email to practice admn

## 2021-02-08 NOTE — PROGRESS NOTES
XR postiive, given referral to Dr Jameel Smyth and script for ulnar gutter splint  Cesarn verbalizes understanding

## 2021-02-09 ENCOUNTER — OFFICE VISIT (OUTPATIENT)
Dept: OBGYN CLINIC | Facility: MEDICAL CENTER | Age: 51
End: 2021-02-09
Payer: COMMERCIAL

## 2021-02-09 ENCOUNTER — TELEPHONE (OUTPATIENT)
Dept: OBGYN CLINIC | Facility: MEDICAL CENTER | Age: 51
End: 2021-02-09

## 2021-02-09 VITALS — TEMPERATURE: 98.6 F | HEIGHT: 73 IN | WEIGHT: 213 LBS | BODY MASS INDEX: 28.23 KG/M2

## 2021-02-09 DIAGNOSIS — S62.336A CLOSED DISPLACED FRACTURE OF NECK OF FIFTH METACARPAL BONE OF RIGHT HAND, INITIAL ENCOUNTER: ICD-10-CM

## 2021-02-09 PROCEDURE — 99244 OFF/OP CNSLTJ NEW/EST MOD 40: CPT | Performed by: STUDENT IN AN ORGANIZED HEALTH CARE EDUCATION/TRAINING PROGRAM

## 2021-02-09 PROCEDURE — 29125 APPL SHORT ARM SPLINT STATIC: CPT | Performed by: STUDENT IN AN ORGANIZED HEALTH CARE EDUCATION/TRAINING PROGRAM

## 2021-02-09 RX ORDER — NAPROXEN 500 MG/1
500 TABLET ORAL 2 TIMES DAILY WITH MEALS
Qty: 30 TABLET | Refills: 0 | Status: SHIPPED | OUTPATIENT
Start: 2021-02-09 | End: 2021-10-28 | Stop reason: SDUPTHER

## 2021-02-09 NOTE — PROGRESS NOTES
1  Closed displaced fracture of neck of fifth metacarpal bone of right hand, initial encounter  Ambulatory referral to Hand Surgery    naproxen (NAPROSYN) 500 mg tablet    Splint application     Orders Placed This Encounter   Procedures    Splint application        Imaging Studies (I personally reviewed images in PACS and report):    Prior imagin  X-ray right hand 2020:  Comminuted fracture of the neck of the 5th metacarpal with mild volar angulation  IMPRESSION:  Right-sided Closed comminuted fracture of neck the 5th metacarpal neck with mild volar angulation after punching a wall    Date of Injury:  2021  Follow up interval:  2 days    PLAN:  Repeat X-ray next visit:   Right hand  - Clinical exam and radiographic imaging reviewed with patient today, with impression as above  Patient placed in ulnar gutter splint placed today as per procedure note below  Prescribed naproxen 500 mg BID PRN pain  Can also take OTC acetaminophen PRN  - Work note provided restricting right hand use at this time  - Follow up in 1 week with plan to transition from splint to cast - planned total immobilization for 4-6 weeks  We will also consider formal PT after discontinuation of immobilization  Return in about 1 week (around 2021)  CHIEF COMPLAINT:  Right hand injury    HPI:  Michael Reece is a right-hand-dominant 52 y o  male  who presents today regards to referral from his primary provider, Dr Esther Blanco, for      Visit 2021 :  Initial evaluation right hand injury: Reports punching a wall on 2021 after being under stress  Initially was seen by his PCP on 2021 in which he had x-rays of his right hand done which noted fracture as mentioned above  Patient subsequently made a homemade splint using piece of wood to keep his 4th and 5th fingers extended while being wrapped in an Ace bandage  Currently reports intermittent, mild, aching pain along the ulnar aspect of his right hand  He continues to have swelling/bruising of his hand and limited ROM due to pain  Denies open wounds  Denies numbness/tingling of his right upper extremity  Denies prior history of right hand injury/surgeries  Pain does not wake him up at night  He is not taking any medications for the pain  Patient is concerned as he works as a   Review of Systems   Constitutional: Negative for chills, diaphoresis and fever  Respiratory: Negative for cough and shortness of breath  Gastrointestinal: Negative for abdominal pain, nausea and vomiting  Musculoskeletal:        As per HPI   Skin: Negative for rash  Neurological:        As per HPI         Medical, Surgical, Family, and Social History    Past Medical History:   Diagnosis Date    Back pain     Poison ivy      History reviewed  No pertinent surgical history    Social History   Social History     Substance and Sexual Activity   Alcohol Use Yes    Frequency: Monthly or less    Drinks per session: 10 or more    Binge frequency: Less than monthly    Comment: occasionally     Social History     Substance and Sexual Activity   Drug Use No     Social History     Tobacco Use   Smoking Status Current Every Day Smoker    Packs/day: 1 50    Types: Cigarettes   Smokeless Tobacco Never Used     Family History   Problem Relation Age of Onset    Diabetes Mother     Diabetes Father      Allergies   Allergen Reactions    Poison Ivy Extract           Physical Exam  Temp 98 6 °F (37 °C)   Ht 6' 1" (1 854 m)   Wt 96 6 kg (213 lb)   BMI 28 10 kg/m²     Constitutional:  see vital signs  Gen: well-developed, normocephalic/atraumatic, well-groomed  Eyes: No inflammation or discharge of conjunctiva or lids; sclera clear   Pharynx: no inflammation, lesion, or mass of lips  Neck: supple, no masses, non-distended  MSK: no inflammation, lesion, mass, or clubbing of nails and digits except for other than mentioned below  SKIN: no visible rashes or skin lesions  Pulmonary/Chest: Effort normal  No respiratory distress  NEURO: cranial nerves grossly intact  PSYCH:  Alert and oriented to person, place, and time; recent and remote memory intact; mood normal, no depression, anxiety, or agitation, judgment and insight good and intact     Ortho Exam   Right Hand  Inspection:  General 1+ edema of the dorsal aspect of the hand  Bruising along the ulnar aspect of the hand  Palpation:  Tenderness of 4th and 5th MCP  Limited range of motion of 4th and 5th digits due to aggravation of pain  Other fingers and thumb have full ROM of MCP, PIP, DIP intact without pain  sensation intact  capillary refill <2secs  Froment sign:  normal  OK sign:  Normal  Thumb extension:  5/5     Right Elbow:  no swelling, erythema, or increased warmth  rom full  nontender  no laxity of joint    Right Wrist  no swelling, erythema, or increased warmth  rom full  nontender         Splint application    Date/Time: 2/9/2021 10:10 AM  Performed by: Papo Martinez MD  Authorized by: Papo Martinez MD   Universal Protocol:  Consent: Verbal consent obtained  Risks and benefits: risks, benefits and alternatives were discussed  Consent given by: patient  Timeout called at: 2/9/2021 10:10 AM   Patient understanding: patient states understanding of the procedure being performed  Radiology Images displayed and confirmed  If images not available, report reviewed: imaging studies available  Patient identity confirmed: verbally with patient      Pre-procedure details:     Sensation:  Normal    Skin color:  Normal tone, no pallor  Procedure details:     Laterality:  Right    Location:  Hand    Hand:  R hand    Splint type:  Ulnar gutter    Supplies:  Elastic bandage, cotton padding and Ortho-Glass  Post-procedure details:     Pain:  Unchanged (no pain)    Sensation:  Normal    Skin color:  Normal tone, no pallor    Patient tolerance of procedure:   Tolerated well, no immediate complications

## 2021-02-09 NOTE — LETTER
February 9, 2021     Patient: Yamile Valdes   YOB: 1970   Date of Visit: 2/9/2021       To Whom it May Concern:    Morenita Loulous is under my professional care  He was seen in my office on 2/9/2021  He is restricted from right hand use at this time until cleared by a physician  If you have any questions or concerns, please don't hesitate to call  Sincerely,          Arben Razo MD        CC: Segundo Stevenson

## 2021-02-09 NOTE — TELEPHONE ENCOUNTER
Left message for patient to call to schedule appointment with Sports Medicine    Left my direct contact #

## 2021-02-16 ENCOUNTER — OFFICE VISIT (OUTPATIENT)
Dept: OBGYN CLINIC | Facility: MEDICAL CENTER | Age: 51
End: 2021-02-16
Payer: COMMERCIAL

## 2021-02-16 ENCOUNTER — APPOINTMENT (OUTPATIENT)
Dept: RADIOLOGY | Facility: MEDICAL CENTER | Age: 51
End: 2021-02-16
Payer: COMMERCIAL

## 2021-02-16 VITALS
HEIGHT: 73 IN | BODY MASS INDEX: 28.23 KG/M2 | DIASTOLIC BLOOD PRESSURE: 71 MMHG | SYSTOLIC BLOOD PRESSURE: 126 MMHG | WEIGHT: 213 LBS | HEART RATE: 77 BPM

## 2021-02-16 DIAGNOSIS — S62.336A CLOSED DISPLACED FRACTURE OF NECK OF FIFTH METACARPAL BONE OF RIGHT HAND, INITIAL ENCOUNTER: ICD-10-CM

## 2021-02-16 DIAGNOSIS — S62.336A CLOSED DISPLACED FRACTURE OF NECK OF FIFTH METACARPAL BONE OF RIGHT HAND, INITIAL ENCOUNTER: Primary | ICD-10-CM

## 2021-02-16 PROCEDURE — 99213 OFFICE O/P EST LOW 20 MIN: CPT | Performed by: STUDENT IN AN ORGANIZED HEALTH CARE EDUCATION/TRAINING PROGRAM

## 2021-02-16 PROCEDURE — 29075 APPL CST ELBW FNGR SHORT ARM: CPT | Performed by: STUDENT IN AN ORGANIZED HEALTH CARE EDUCATION/TRAINING PROGRAM

## 2021-02-16 PROCEDURE — 73130 X-RAY EXAM OF HAND: CPT

## 2021-02-16 NOTE — LETTER
February 16, 2021     Patient: Serenity Nicole   YOB: 1970   Date of Visit: 2/16/2021       To Whom it May Concern:    Kenneth Arzola is under my professional care  He was seen in my office on 2/16/2021  He is restricted from right hand use, other than for writing/typing, at this time until cleared by a physician  He will be following up with me in approximately 3 weeks for re-evaluation  If you have any questions or concerns, please don't hesitate to call  Sincerely,          Petra Nuñez MD        CC: Jewel Miss Kearney Garfield

## 2021-02-16 NOTE — PATIENT INSTRUCTIONS
I reviewed cast precautions including instruction not to wet cast  instructed if any swelling, pain, numbness, tingling then to contact office immediately for instruction or go to ER if physician unavailable  reviewed risks of cast including joint stiffness, skin breakdown, ulceration, risk of infection if wedging objects behind cast  Patient expressed understanding and agreed to plan

## 2021-02-16 NOTE — PROGRESS NOTES
1  Closed displaced fracture of neck of fifth metacarpal bone of right hand, initial encounter  XR hand 3+ vw right     Orders Placed This Encounter   Procedures    XR hand 3+ vw right        Imaging Studies (I personally reviewed images in PACS and report): 1  X-ray right hand 2021: Re-demonstrates comminute fracture of the neck of the 5th metacarpal with mild volar angulation  No significant interval change compared to prior X-ray  Prior imagin  X-ray right hand 2021:  Comminuted fracture of the neck of the 5th metacarpal with mild volar angulation  IMPRESSION:  Right-sided Closed comminuted fracture of neck the 5th metacarpal neck with mild volar angulation after punching a wall     Date of Injury:  2021  Follow up interval:  1 week, 2 days    PLAN:  Repeat X-ray next visit:   Right hand  - Patient reportedly had removed the splint since last visit and reapplied it on his own  I repeated X-ray of the right wrist which shows no interval change of fracture since last visit - I will follow up official radiology interpretation  - Patient transitioned from splint to [facility] today (as per procedure note below) for planned total immobilization of approximately 4 weeks  Cast precautions discussed as per patient instructions  - In regards to pain control, can take naproxen and/or acetaminophen as needed  - Work note provided as per communcations  Return in about 3 weeks (around 3/9/2021)  CHIEF COMPLAINT:  Follow up right hand pain    HPI:  Yoshi Enamorado is a 52 y o  male  who presents for       Visit 2021 : Follow up 5th metacarpal fracture: Improved  Patient reports intensity/frequency of pain are improved compared to last visit  He did admit that he had taken his ACE bandage off his splint since last visit as it was "too tight"  - report rewrapping the ACE bandage himself  Denies new injuries since last visit   Reports swelling has improved and has intermittent brief episdoes of tingling  Reports sensation of stiffness of hand/fingers  Denies bruising, numbness, and other ROS as per below  Has not required to take any pain medications since last visit  Visit summary 02/09/2021 :  Initial evaluation right hand injury after patient punched a wall on 2/7/2021 from stress  Initially was seen by his PCP on 02/08/2021 in which he had x-rays of his right hand done which noted fracture as mentioned above  Patient subsequently made a homemade splint using piece of wood to keep his 4th and 5th fingers extended while being wrapped in an Ace bandage  I transitioned his home-made splint to a ulnar gutter splint  Prescribed naproxen and counseled conservative pain management  Recommended f/u 1 week for casting  Review of Systems   Constitutional: Negative for chills, diaphoresis and fever  Respiratory: Negative for cough and shortness of breath  Gastrointestinal: Negative for abdominal pain, nausea and vomiting  Musculoskeletal:        As per HPI   Skin: Negative for rash  Neurological:        As per HPI         Medical, Surgical, Family, and Social History    Past Medical History:   Diagnosis Date    Back pain     Poison ivy      History reviewed  No pertinent surgical history    Social History   Social History     Substance and Sexual Activity   Alcohol Use Yes    Frequency: Monthly or less    Drinks per session: 10 or more    Binge frequency: Less than monthly    Comment: occasionally     Social History     Substance and Sexual Activity   Drug Use No     Social History     Tobacco Use   Smoking Status Current Every Day Smoker    Packs/day: 1 50    Types: Cigarettes   Smokeless Tobacco Never Used     Family History   Problem Relation Age of Onset    Diabetes Mother     Diabetes Father      Allergies   Allergen Reactions    Poison Ivy Extract           Physical Exam  /71   Pulse 77   Ht 6' 1" (1 854 m)   Wt 96 6 kg (213 lb)   BMI 28 10 kg/m² Constitutional:  see vital signs  Gen: well-developed, normocephalic/atraumatic, well-groomed  Eyes: No inflammation or discharge of conjunctiva or lids; sclera clear   Pharynx: no inflammation, lesion, or mass of lips  Neck: supple, no masses, non-distended  MSK: no inflammation, lesion, mass, or clubbing of nails and digits except for other than mentioned below  SKIN: no visible rashes or skin lesions  Pulmonary/Chest: Effort normal  No respiratory distress  NEURO: cranial nerves grossly intact  PSYCH:  Alert and oriented to person, place, and time; recent and remote memory intact; mood normal, no depression, anxiety, or agitation, judgment and insight good and intact     Ortho Exam  Right Hand  Inspections: Resolution of swelling, no redness/ecchymosis  Tenderness: +metacarpal neck but mild and less intense than before per patient  Slightly limited rom fingers mcp, pip, dip   No digital scissoring or deviation of fingers  no rotation of fingers  no joint laxity  Strength: flexion and extension mcp, pip, dip (excluding his little finger): 5/5  sensation intact  capillary refill <2 secs   Froment sign:  normal  OK sign:  Normal  Thumb extension:  5/5    Right Wrist  no swelling, erythema, or increased warmth  Slightly limited wrist flexion/extension d/t reported stiffness per patient  nontender  no laxity of joint; druj stable    Cast application    Date/Time: 2/16/2021 11:25 AM  Performed by: Kylie Minor MD  Authorized by: Kylie Minor MD   Universal Protocol:  Consent: Verbal consent obtained  Risks and benefits: risks, benefits and alternatives were discussed  Consent given by: patient  Time out: Immediately prior to procedure a "time out" was called to verify the correct patient, procedure, equipment, support staff and site/side marked as required    Timeout called at: 2/16/2021 11:25 AM   Patient understanding: patient states understanding of the procedure being performed  Site marked: the operative site was marked  Radiology Images displayed and confirmed  If images not available, report reviewed: imaging studies available  Patient identity confirmed: verbally with patient      Pre-procedure details:     Sensation:  Normal    Skin color:  No discoloration/bruising  Procedure details:     Laterality:  Right    Location:  Wrist    Wrist:  R wristCast type:  Short arm (extends to the PIP of the little finger and MCP of ring finger )    Supplies:  Cotton padding and fiberglass  Post-procedure details:     Pain:  Improved    Sensation:  Normal    Skin color:  No discoloration    Patient tolerance of procedure:   Tolerated well, no immediate complications

## 2021-02-17 ENCOUNTER — TELEPHONE (OUTPATIENT)
Dept: OBGYN CLINIC | Facility: HOSPITAL | Age: 51
End: 2021-02-17

## 2021-02-17 NOTE — TELEPHONE ENCOUNTER
Patient Sees Dr Chester Son    Patient wants to see if the Dr Lennox Maas completed the paper work for his short term disability       - [phone]

## 2021-02-23 NOTE — TELEPHONE ENCOUNTER
Well , 15 Months Old  Well-child exams are recommended visits with a health care provider to track your child's growth and development at certain ages. This sheet tells you what to expect during this visit.  Recommended immunizations  · Hepatitis B vaccine. The third dose of a 3-dose series should be given at age 6-18 months. The third dose should be given at least 16 weeks after the first dose and at least 8 weeks after the second dose. A fourth dose is recommended when a combination vaccine is received after the birth dose.  · Diphtheria and tetanus toxoids and acellular pertussis (DTaP) vaccine. The fourth dose of a 5-dose series should be given at age 15-18 months. The fourth dose may be given 6 months or more after the third dose.  · Haemophilus influenzae type b (Hib) booster. A booster dose should be given when your child is 12-15 months old. This may be the third dose or fourth dose of the vaccine series, depending on the type of vaccine.  · Pneumococcal conjugate (PCV13) vaccine. The fourth dose of a 4-dose series should be given at age 12-15 months. The fourth dose should be given 8 weeks after the third dose.  ? The fourth dose is needed for children age 12-59 months who received 3 doses before their first birthday. This dose is also needed for high-risk children who received 3 doses at any age.  ? If your child is on a delayed vaccine schedule in which the first dose was given at age 7 months or later, your child may receive a final dose at this time.  · Inactivated poliovirus vaccine. The third dose of a 4-dose series should be given at age 6-18 months. The third dose should be given at least 4 weeks after the second dose.  · Influenza vaccine (flu shot). Starting at age 6 months, your child should get the flu shot every year. Children between the ages of 6 months and 8 years who get the flu shot for the first time should get a second dose at least 4 weeks after the first dose. After that,  Patient has not heard if disablity fax was received, Please return call, He sent it to back fax, will resend to main fax  only a single yearly (annual) dose is recommended.  · Measles, mumps, and rubella (MMR) vaccine. The first dose of a 2-dose series should be given at age 12-15 months.  · Varicella vaccine. The first dose of a 2-dose series should be given at age 12-15 months.  · Hepatitis A vaccine. A 2-dose series should be given at age 12-23 months. The second dose should be given 6-18 months after the first dose. If a child has received only one dose of the vaccine by age 24 months, he or she should receive a second dose 6-18 months after the first dose.  · Meningococcal conjugate vaccine. Children who have certain high-risk conditions, are present during an outbreak, or are traveling to a country with a high rate of meningitis should get this vaccine.  Your child may receive vaccines as individual doses or as more than one vaccine together in one shot (combination vaccines). Talk with your child's health care provider about the risks and benefits of combination vaccines.  Testing  Vision  · Your child's eyes will be assessed for normal structure (anatomy) and function (physiology). Your child may have more vision tests done depending on his or her risk factors.  Other tests  · Your child's health care provider may do more tests depending on your child's risk factors.  · Screening for signs of autism spectrum disorder (ASD) at this age is also recommended. Signs that health care providers may look for include:  ? Limited eye contact with caregivers.  ? No response from your child when his or her name is called.  ? Repetitive patterns of behavior.  General instructions  Parenting tips  · Praise your child's good behavior by giving your child your attention.  · Spend some one-on-one time with your child daily. Vary activities and keep activities short.  · Set consistent limits. Keep rules for your child clear, short, and simple.  · Recognize that your child has a limited ability to understand consequences at this age.  · Interrupt  "your child's inappropriate behavior and show him or her what to do instead. You can also remove your child from the situation and have him or her do a more appropriate activity.  · Avoid shouting at or spanking your child.  · If your child cries to get what he or she wants, wait until your child briefly calms down before giving him or her the item or activity. Also, model the words that your child should use (for example, \"cookie please\" or \"climb up\").  Oral health    · Brush your child's teeth after meals and before bedtime. Use a small amount of non-fluoride toothpaste.  · Take your child to a dentist to discuss oral health.  · Give fluoride supplements or apply fluoride varnish to your child's teeth as told by your child's health care provider.  · Provide all beverages in a cup and not in a bottle. Using a cup helps to prevent tooth decay.  · If your child uses a pacifier, try to stop giving the pacifier to your child when he or she is awake.  Sleep  · At this age, children typically sleep 12 or more hours a day.  · Your child may start taking one nap a day in the afternoon. Let your child's morning nap naturally fade from your child's routine.  · Keep naptime and bedtime routines consistent.  What's next?  Your next visit will take place when your child is 18 months old.  Summary  · Your child may receive immunizations based on the immunization schedule your health care provider recommends.  · Your child's eyes will be assessed, and your child may have more tests depending on his or her risk factors.  · Your child may start taking one nap a day in the afternoon. Let your child's morning nap naturally fade from your child's routine.  · Brush your child's teeth after meals and before bedtime. Use a small amount of non-fluoride toothpaste.  · Set consistent limits. Keep rules for your child clear, short, and simple.  This information is not intended to replace advice given to you by your health care provider. Make " sure you discuss any questions you have with your health care provider.  Document Released: 01/07/2008 Document Revised: 04/07/2020 Document Reviewed: 2019  Elsevier Patient Education © 2020 [employer]

## 2021-02-26 NOTE — TELEPHONE ENCOUNTER
Spoke to Patient and informed him I received the forms and will complete today  He was very upset that I didn't call him back  I apologized and let him know I didn't received the Message until This morning  Patient is okay now and Forms were Completed/ Scanned and Faxed   Thank you

## 2021-02-26 NOTE — TELEPHONE ENCOUNTER
Patient is calling back in very upset stating that he has not heard anything back relating his disability  He is asking if this has even been received or not?  Patient is asking for a call back at [phone]

## 2021-03-08 NOTE — PROGRESS NOTES
1  Closed displaced fracture of neck of fifth metacarpal bone of right hand with routine healing, subsequent encounter  XR hand 3+ vw right     Orders Placed This Encounter   Procedures    XR hand 3+ vw right        Imaging Studies (I personally reviewed images in PACS and report): 1  X-ray right hand 2021: Kept in cast   Unchanged alignment of distal 5th metacarpal neck fracture  Prior imagin  X-ray right hand 2021: Unchanged alignment of distal 5th metacarpal neck fracture  2  X-ray right hand 2021:  Comminuted fracture of the neck of the 5th metacarpal with mild volar angulation  IMPRESSION:  Right-sided Closed comminuted fracture of neck the 5th metacarpal neck with mild volar angulation after punching a wall     Date of Injury:  2021  Follow up interval:  4 week, 2 days    Risk factors: chronic tobacco use    PLAN:  Clinical exam and radiographic imaging reviewed with patient today, with impression as per above  Due to persistent but improving pain of his 5th metacarpal neck fracture per patient, will continue his casting for the next 1-2 weeks  Work note provided with restrictions  Recommended as needed use of acetaminophen/NSAIDs for pain  Return in about 2 weeks (around 3/23/2021)  CHIEF COMPLAINT:  Follow-up right hand fracture    HPI:  Bell Salgado is a 54 y o  male  who presents for     Visit  2021 : Follow-up evaluation right 5th metacarpal neck fracture:  Patient is approximately 4 weeks since is splinted and casted: Improving  Still reports some pain/discomfort with tingling of his right lateral hand when his arm his hanging by its side  Pain does not radiate  This discomfort progressively resolves when he lifts his arm  Denies any swelling of his hand or bruising  Pain described as mild, aching, and intermittent  No pain at night  Denies use of pain medications  Denies new injuries since last visit   Visit summary 2021 :   Follow up 5th metacarpal fracture: Improved  Patient reports intensity/frequency of pain are improved compared to last visit  He admitted that he had taken his ACE bandage off his splint since last visit as it was "too tight"  - report rewrapping the ACE bandage himself  I repeated x-ray images which do not show any interval change from his fracture  Patient transition from splint to cast      Visit summary 02/09/2021 :  Initial evaluation right hand injury after patient punched a wall on 2/7/2021 from stress   Initially was seen by his PCP on 02/08/2021 in which he had x-rays of his right hand done which noted fracture as mentioned above   Patient subsequently made a homemade splint using piece of wood to keep his 4th and 5th fingers extended while being wrapped in an Ace bandage  Review of Systems   Constitutional: Negative for chills, diaphoresis and fever  Respiratory: Negative for cough and shortness of breath  Gastrointestinal: Negative for abdominal pain, nausea and vomiting  Musculoskeletal:        As per HPI   Skin: Negative for rash  Neurological:        As per HPI         Medical, Surgical, Family, and Social History    Past Medical History:   Diagnosis Date    Back pain     Poison ivy      History reviewed  No pertinent surgical history    Social History   Social History     Substance and Sexual Activity   Alcohol Use Yes    Frequency: Monthly or less    Drinks per session: 10 or more    Binge frequency: Less than monthly    Comment: occasionally     Social History     Substance and Sexual Activity   Drug Use No     Social History     Tobacco Use   Smoking Status Current Every Day Smoker    Packs/day: 1 50    Types: Cigarettes   Smokeless Tobacco Never Used     Family History   Problem Relation Age of Onset    Diabetes Mother     Diabetes Father      Allergies   Allergen Reactions    Poison Ivy Extract           Physical Exam  /81   Pulse 105   Temp 98 °F (36 7 °C)   Ht 6' 1" (1 854 m)   Wt 96 6 kg (213 lb)   BMI 28 10 kg/m²     Constitutional:  see vital signs  Gen: well-developed, normocephalic/atraumatic, well-groomed  Eyes: No inflammation or discharge of conjunctiva or lids; sclera clear   Pharynx: no inflammation, lesion, or mass of lips  Neck: supple, no masses, non-distended  MSK: no inflammation, lesion, mass, or clubbing of nails and digits except for other than mentioned below  SKIN: no visible rashes or skin lesions  Pulmonary/Chest: Effort normal  No respiratory distress  NEURO: cranial nerves grossly intact  PSYCH:  Alert and oriented to person, place, and time; recent and remote memory intact; mood normal, no depression, anxiety, or agitation, judgment and insight good and intact     Ortho Exam  Wrist/Hand focused exam: (patient kept in cast d/t reported pain)  On inspection patient has no ecchymosis, erythema  He is able to demonstrate finger flexion and extension  In regards to his 5th digit he is able to move his PIP and DIP joints without issue  Full elbow flexion/extension ROM  Capillary refill<2 seconds  Sensation intact and symmetrical with his contralateral hand

## 2021-03-09 ENCOUNTER — OFFICE VISIT (OUTPATIENT)
Dept: OBGYN CLINIC | Facility: MEDICAL CENTER | Age: 51
End: 2021-03-09
Payer: COMMERCIAL

## 2021-03-09 ENCOUNTER — APPOINTMENT (OUTPATIENT)
Dept: RADIOLOGY | Facility: MEDICAL CENTER | Age: 51
End: 2021-03-09
Payer: COMMERCIAL

## 2021-03-09 VITALS
SYSTOLIC BLOOD PRESSURE: 133 MMHG | DIASTOLIC BLOOD PRESSURE: 81 MMHG | BODY MASS INDEX: 28.23 KG/M2 | HEIGHT: 73 IN | HEART RATE: 105 BPM | TEMPERATURE: 98 F | WEIGHT: 213 LBS

## 2021-03-09 DIAGNOSIS — S62.336D CLOSED DISPLACED FRACTURE OF NECK OF FIFTH METACARPAL BONE OF RIGHT HAND WITH ROUTINE HEALING, SUBSEQUENT ENCOUNTER: ICD-10-CM

## 2021-03-09 DIAGNOSIS — S62.336D CLOSED DISPLACED FRACTURE OF NECK OF FIFTH METACARPAL BONE OF RIGHT HAND WITH ROUTINE HEALING, SUBSEQUENT ENCOUNTER: Primary | ICD-10-CM

## 2021-03-09 PROCEDURE — 73130 X-RAY EXAM OF HAND: CPT

## 2021-03-09 PROCEDURE — 99213 OFFICE O/P EST LOW 20 MIN: CPT | Performed by: STUDENT IN AN ORGANIZED HEALTH CARE EDUCATION/TRAINING PROGRAM

## 2021-03-09 NOTE — LETTER
March 9, 2021     Patient: Bell Salgado   YOB: 1970   Date of Visit: 3/9/2021       To Whom it May Concern:    Amairani Bales is under my professional care  He was seen in my office on 3/9/2021  He is restricted from right hand use, other than for writing/typing, at this time until cleared by a physician  He will be following up with me in approximately 2 weeks for re-evaluation  If you have any questions or concerns, please don't hesitate to call  Sincerely,          Delfina Romero MD        CC: Yonny Mello

## 2021-03-25 NOTE — PROGRESS NOTES
1  Closed displaced fracture of neck of fifth metacarpal bone of right hand with routine healing, subsequent encounter  XR hand 3+ vw right    Cock Up Wrist Splint     Orders Placed This Encounter   Procedures    Cock Up Wrist Splint    XR hand 3+ vw right        Imaging Studies (I personally reviewed images in PACS and report): 1  X-ray right hand 2021: Stable alignment of distal 5th metacarpal neck with ongoing healing as demonstrated by callus formation      Prior imagin  X-ray right hand 2021: Kept in cast   Stable alignment of a healing distal right 5th metacarpal bone fracture  2  X-ray right hand 2021: Unchanged alignment of distal 5th metacarpal neck fracture  3  X-ray right hand 2021:  Comminuted fracture of the neck of the 5th metacarpal with mild volar angulation  IMPRESSION:  Right-sided Closed comminuted fracture of neck the 5th metacarpal neck with mild volar angulation after punching a wall     Date of Injury:  2021  Follow up interval:  7 weeks, 2 days     Risk factors: chronic tobacco use    PLAN:  Repeat X-ray next visit:   None  Clinical exam radiographic imaging reviewed with patient today, with impression as per above  I have discontinued his cast at this time  Recommended hand exercises (home exercises giving) to improve ROM/handgrip strengthening  Universal hand splint prescribed for protective purposes - I counseled to only use when active for the next 2 weeks  Work note made as per communications; follow up in 2 weeks to allow progressive improvement of right hand strength/ROM/function  Return in about 2 weeks (around 2021)  CHIEF COMPLAINT:  Follow-up right hand fracture    HPI:  Erasto Bolaños is a 54 y o  male  who presents for       Visit 2021 : Follow up right fifth metacarpal fracture: Improved  Cast removed today  Patient denies pain of hand/wrist and only c/o stiffness of gripping and wrist ROM   Denies swelling, numbness/tingling, skin erythema, and other ROS as per below  Denies new injuries since last visit  Denies use of pain medications  Visit summary 03/09/2021 : Follow-up evaluation right 5th metacarpal neck fracture:  Patient is approximately 4 weeks since is splinted and casted: Improving but still reported pain of lateral hand at site of fracture  Repeat X-ray in cast, as noted above  Kept in cast for 2 more weeks      Visit summary 02/16/2021 :  Follow up 5th metacarpal fracture: Improved  Patient reports intensity/frequency of pain are improved compared to last visit  He admitted that he had taken his ACE bandage off his splint since last visit as it was "too tight"  - report rewrapping the ACE bandage himself  I repeated x-ray images which do not show any interval change from his fracture  Patient transition from splint to cast      Visit summary 02/09/2021 :  Initial evaluation right hand injury after patient punched a wall on 2/7/2021 from stress   Initially was seen by his PCP on 02/08/2021 in which he had x-rays of his right hand done which noted fracture as mentioned above   Patient subsequently made a homemade splint using piece of wood to keep his 4th and 5th fingers extended while being wrapped in an Ace bandage  Review of Systems   Constitutional: Negative for chills, diaphoresis and fever  Respiratory: Negative for cough and shortness of breath  Gastrointestinal: Negative for abdominal pain, nausea and vomiting  Musculoskeletal:        As per HPI   Skin: Negative for rash  Neurological:        As per HPI         Medical, Surgical, Family, and Social History    Past Medical History:   Diagnosis Date    Back pain     Poison ivy      History reviewed  No pertinent surgical history    Social History   Social History     Substance and Sexual Activity   Alcohol Use Yes    Frequency: Monthly or less    Drinks per session: 10 or more    Binge frequency: Less than monthly    Comment: occasionally     Social History     Substance and Sexual Activity   Drug Use No     Social History     Tobacco Use   Smoking Status Current Every Day Smoker    Packs/day: 1 50    Types: Cigarettes   Smokeless Tobacco Never Used     Family History   Problem Relation Age of Onset    Diabetes Mother     Diabetes Father      Allergies   Allergen Reactions    Poison Ivy Extract           Physical Exam  Ht 6' 1" (1 854 m)   Wt 96 6 kg (213 lb)   BMI 28 10 kg/m²     Constitutional:  see vital signs  Gen: well-developed, normocephalic/atraumatic, well-groomed  Eyes: No inflammation or discharge of conjunctiva or lids; sclera clear   Pharynx: no inflammation, lesion, or mass of lips  Neck: supple, no masses, non-distended  MSK: no inflammation, lesion, mass, or clubbing of nails and digits except for other than mentioned below  SKIN: no visible rashes or skin lesions  Pulmonary/Chest: Effort normal  No respiratory distress  NEURO: cranial nerves grossly intact  PSYCH:  Alert and oriented to person, place, and time; recent and remote memory intact; mood normal, no depression, anxiety, or agitation, judgment and insight good and intact     Ortho Exam   Right Hand  no erythema  Swelling: none  Tenderness: none  Limited flexion of fingers at mcp, pip, dip but intact with c/o general stiffness/discomfort  No noted digital scissoring or deviation of fingers, but exam is limited due to stiffness  no extensor lag  no rotation of fingers  no joint laxity  Unable to test  strength as patient cannot make fist due to pain from stiffness  Finger extension (MCP/PIP/DRIP) 5/5  sensation intact  capillary refill <2 seconds      Right Wrist  no swelling, erythema, or increased warmth  rom has limited flexion/extension of 20 degrees d/t stiffness   Full pronation/supination  nontender  no laxity of joint; druj stable

## 2021-03-30 ENCOUNTER — APPOINTMENT (OUTPATIENT)
Dept: RADIOLOGY | Facility: MEDICAL CENTER | Age: 51
End: 2021-03-30
Payer: COMMERCIAL

## 2021-03-30 ENCOUNTER — OFFICE VISIT (OUTPATIENT)
Dept: OBGYN CLINIC | Facility: MEDICAL CENTER | Age: 51
End: 2021-03-30
Payer: COMMERCIAL

## 2021-03-30 VITALS — HEIGHT: 73 IN | BODY MASS INDEX: 28.23 KG/M2 | WEIGHT: 213 LBS

## 2021-03-30 DIAGNOSIS — S62.336D CLOSED DISPLACED FRACTURE OF NECK OF FIFTH METACARPAL BONE OF RIGHT HAND WITH ROUTINE HEALING, SUBSEQUENT ENCOUNTER: ICD-10-CM

## 2021-03-30 DIAGNOSIS — S62.336D CLOSED DISPLACED FRACTURE OF NECK OF FIFTH METACARPAL BONE OF RIGHT HAND WITH ROUTINE HEALING, SUBSEQUENT ENCOUNTER: Primary | ICD-10-CM

## 2021-03-30 PROCEDURE — 73130 X-RAY EXAM OF HAND: CPT

## 2021-03-30 PROCEDURE — 3008F BODY MASS INDEX DOCD: CPT | Performed by: STUDENT IN AN ORGANIZED HEALTH CARE EDUCATION/TRAINING PROGRAM

## 2021-03-30 PROCEDURE — 99213 OFFICE O/P EST LOW 20 MIN: CPT | Performed by: STUDENT IN AN ORGANIZED HEALTH CARE EDUCATION/TRAINING PROGRAM

## 2021-03-30 NOTE — LETTER
March 30, 2021     Patient: Ansley Wallace   YOB: 1970   Date of Visit: 3/30/2021       To Whom it May Concern:    Radha Shaffer is under my professional care  He was seen in my office on 3/30/2021  He is restricted from right hand use, other than for writing/typing, at this time until cleared by a physician  He will be following up with me in approximately 2 weeks for re-evaluation  If you have any questions or concerns, please don't hesitate to call  Sincerely,          Betty Herzog MD        CC: Jazmine Salmon

## 2021-04-05 ENCOUNTER — TELEPHONE (OUTPATIENT)
Dept: OBGYN CLINIC | Facility: MEDICAL CENTER | Age: 51
End: 2021-04-05

## 2021-04-05 NOTE — TELEPHONE ENCOUNTER
Patient sees Dr Samantha Scott  Patient calling stating his hand is very swollen and very stiff  He is asking if this is normal, he cast has been removed about 4 days ago and it is still very swollen  He is looking for a call back       # [phone]

## 2021-04-12 NOTE — PROGRESS NOTES
1  Closed displaced fracture of neck of fifth metacarpal bone of right hand with routine healing, subsequent encounter       No orders of the defined types were placed in this encounter  Imaging Studies (I personally reviewed images in PACS and report):    Prior imagin  X-ray right hand 2021: Continued healing of the distal 5th metacarpal fracture  Alignment is stable  2  X-ray right hand 2021: Kept in cast   Stable alignment of a healing distal right 5th metacarpal bone fracture  3  X-ray right hand 2021: Unchanged alignment of distal 5th metacarpal neck fracture  4  X-ray right hand 2021:  Comminuted fracture of the neck of the 5th metacarpal with mild volar angulation  IMPRESSION:  Right-sided Closed comminuted fracture of neck the 5th metacarpal neck with mild volar angulation after punching a wall - improved; 5/5  strength, sensation intact     Date of Injury:  2021  Follow up interval:  9 weeks, 2 days      Risk factors: chronic tobacco use    Interventions: Splinting to casting for approx 6 weeks, then transitioned to bracing for 2 weeks (for protective purposes), home exercises    PLAN:  Clinical exam imaging reviewed patient today, with impression as per above  Patient has improved hand function and strength / range of motion to point that I feel he can return to work  Work note provided allowing him to return without restrictions as per communications  Counseled patient to continue work on hand exercises to improve the sensation of stiffness that he is reporting today  I counseled that he no longer needs to wear his wrist brace  Patient will follow-up as needed  Return if symptoms worsen or fail to improve  CHIEF COMPLAINT:  Follow up right hand fracture    HPI:  Pricila Jurado is a 54 y o  male  who presents for       Visit 2021 :   Follow up evaluation of right fifth metacarpal fracture: Since last visit, he was placed in a universal brace for protective purposes and provided home exercises  Patient reports improvement since last visit in that no longer has pain of his hand and only reports slight stiffness  His stiffness has progressively improved and he is able to make a fist and  objects again with almost similar intensity as his left hand  He reports mild intermittent swelling of his right ring finger, but denies pain, redness, limited range of motion of his ring finger  Denies new injuries since last visit  Denies right upper extremity numbness/tingling, and other ROS as per below  He denies use of pain medications since last visit  Review of Systems   Constitutional: Negative for chills, diaphoresis and fever  Respiratory: Negative for cough and shortness of breath  Gastrointestinal: Negative for abdominal pain, nausea and vomiting  Musculoskeletal:        As per HPI   Skin: Negative for rash  Neurological:        As per HPI         Medical, Surgical, Family, and Social History    Past Medical History:   Diagnosis Date    Back pain     Poison ivy      History reviewed  No pertinent surgical history    Social History   Social History     Substance and Sexual Activity   Alcohol Use Yes    Frequency: Monthly or less    Drinks per session: 10 or more    Binge frequency: Less than monthly    Comment: occasionally     Social History     Substance and Sexual Activity   Drug Use No     Social History     Tobacco Use   Smoking Status Current Every Day Smoker    Packs/day: 1 50    Types: Cigarettes   Smokeless Tobacco Never Used     Family History   Problem Relation Age of Onset    Diabetes Mother     Diabetes Father      Allergies   Allergen Reactions    Poison Ivy Extract           Physical Exam  /74   Pulse 89   Ht 6' 1" (1 854 m)   Wt 96 6 kg (213 lb)   BMI 28 10 kg/m²     Constitutional:  see vital signs  Gen: well-developed, normocephalic/atraumatic, well-groomed  Eyes: No inflammation or discharge of conjunctiva or lids; sclera clear   Pharynx: no inflammation, lesion, or mass of lips  Neck: supple, no masses, non-distended  MSK: no inflammation, lesion, mass, or clubbing of nails and digits except for other than mentioned below  SKIN: no visible rashes or skin lesions  Pulmonary/Chest: Effort normal  No respiratory distress  NEURO: cranial nerves grossly intact  PSYCH:  Alert and oriented to person, place, and time; recent and remote memory intact; mood normal, no depression, anxiety, or agitation, judgment and insight good and intact     Ortho Exam  Right Hand  No erythema/bruising  Swelling: none  Tenderness: none   Patient demonstrates full range of motion of the digits of his MCP, PIP, DIP  No noted digital scissoring or deviation of fingers  no extensor lag  no rotation of fingers  no joint laxity   strength 5/5  Finger extension (MCP/PIP/DRIP) 5/5  sensation intact  capillary refill <2 seconds       Right Wrist  no swelling, erythema, or increased warmth  range of motion:  Intact, wrist flexion and extension of 50°  Full pronation/supination  nontender  no laxity of joint; druj stable

## 2021-04-13 ENCOUNTER — OFFICE VISIT (OUTPATIENT)
Dept: OBGYN CLINIC | Facility: MEDICAL CENTER | Age: 51
End: 2021-04-13
Payer: COMMERCIAL

## 2021-04-13 VITALS
WEIGHT: 213 LBS | HEIGHT: 73 IN | HEART RATE: 89 BPM | DIASTOLIC BLOOD PRESSURE: 74 MMHG | SYSTOLIC BLOOD PRESSURE: 129 MMHG | BODY MASS INDEX: 28.23 KG/M2

## 2021-04-13 DIAGNOSIS — S62.336D CLOSED DISPLACED FRACTURE OF NECK OF FIFTH METACARPAL BONE OF RIGHT HAND WITH ROUTINE HEALING, SUBSEQUENT ENCOUNTER: Primary | ICD-10-CM

## 2021-04-13 PROCEDURE — 3008F BODY MASS INDEX DOCD: CPT | Performed by: STUDENT IN AN ORGANIZED HEALTH CARE EDUCATION/TRAINING PROGRAM

## 2021-04-13 PROCEDURE — 99213 OFFICE O/P EST LOW 20 MIN: CPT | Performed by: STUDENT IN AN ORGANIZED HEALTH CARE EDUCATION/TRAINING PROGRAM

## 2021-04-13 NOTE — LETTER
April 13, 2021     Patient: Faith Torres   YOB: 1970   Date of Visit: 4/13/2021       To Whom it May Concern:    Maurorian Warner is under my professional care  He was seen in my office on 4/13/2021  Patient initially seen on 02/09/2021 in which he was restricted from right hand use; he will be cleared without restrictions on 04/19/2021  If you have any questions or concerns, please don't hesitate to call  Sincerely,          Juan Alexandra MD        CC: Ursula Delgado

## 2021-04-13 NOTE — LETTER
April 13, 2021     Patient: George Villar   YOB: 1970   Date of Visit: 4/13/2021       To Whom it May Concern:    Jane Washington is under my professional care  He was seen in my office on 4/13/2021  He can return to work without restrictions on 04/19/2021  If you have any questions or concerns, please don't hesitate to call  Sincerely,          Valarie White MD        CC: Nicole Bravo

## 2021-10-28 ENCOUNTER — OFFICE VISIT (OUTPATIENT)
Dept: INTERNAL MEDICINE CLINIC | Age: 51
End: 2021-10-28
Payer: COMMERCIAL

## 2021-10-28 VITALS
OXYGEN SATURATION: 97 % | HEIGHT: 73 IN | TEMPERATURE: 98 F | SYSTOLIC BLOOD PRESSURE: 110 MMHG | DIASTOLIC BLOOD PRESSURE: 80 MMHG | WEIGHT: 217.8 LBS | BODY MASS INDEX: 28.86 KG/M2 | HEART RATE: 72 BPM

## 2021-10-28 DIAGNOSIS — Z13.220 SCREENING CHOLESTEROL LEVEL: ICD-10-CM

## 2021-10-28 DIAGNOSIS — S62.336A CLOSED DISPLACED FRACTURE OF NECK OF FIFTH METACARPAL BONE OF RIGHT HAND, INITIAL ENCOUNTER: ICD-10-CM

## 2021-10-28 DIAGNOSIS — Z11.59 ENCOUNTER FOR HEPATITIS C SCREENING TEST FOR LOW RISK PATIENT: ICD-10-CM

## 2021-10-28 DIAGNOSIS — M75.22 BICEPS TENDINITIS OF LEFT UPPER EXTREMITY: Primary | ICD-10-CM

## 2021-10-28 PROBLEM — S69.91XA HAND TRAUMA, RIGHT, INITIAL ENCOUNTER: Status: RESOLVED | Noted: 2021-02-08 | Resolved: 2021-10-28

## 2021-10-28 PROBLEM — J01.00 ACUTE MAXILLARY SINUSITIS: Status: RESOLVED | Noted: 2020-01-20 | Resolved: 2021-10-28

## 2021-10-28 PROBLEM — R22.31 LOCALIZED SWELLING ON RIGHT HAND: Status: RESOLVED | Noted: 2021-02-08 | Resolved: 2021-10-28

## 2021-10-28 PROCEDURE — 99213 OFFICE O/P EST LOW 20 MIN: CPT | Performed by: FAMILY MEDICINE

## 2021-10-28 RX ORDER — NAPROXEN 500 MG/1
500 TABLET ORAL 2 TIMES DAILY WITH MEALS
Qty: 30 TABLET | Refills: 0 | Status: SHIPPED | OUTPATIENT
Start: 2021-10-28

## 2021-10-29 ENCOUNTER — APPOINTMENT (OUTPATIENT)
Dept: LAB | Age: 51
End: 2021-10-29
Payer: COMMERCIAL

## 2021-10-29 DIAGNOSIS — Z11.59 ENCOUNTER FOR HEPATITIS C SCREENING TEST FOR LOW RISK PATIENT: ICD-10-CM

## 2021-10-29 DIAGNOSIS — Z13.220 SCREENING CHOLESTEROL LEVEL: ICD-10-CM

## 2021-10-29 DIAGNOSIS — M75.22 BICEPS TENDINITIS OF LEFT UPPER EXTREMITY: ICD-10-CM

## 2021-10-29 LAB
ALBUMIN SERPL BCP-MCNC: 4 G/DL (ref 3.5–5)
ALP SERPL-CCNC: 75 U/L (ref 46–116)
ALT SERPL W P-5'-P-CCNC: 36 U/L (ref 12–78)
ANION GAP SERPL CALCULATED.3IONS-SCNC: 0 MMOL/L (ref 4–13)
AST SERPL W P-5'-P-CCNC: 18 U/L (ref 5–45)
BASOPHILS # BLD AUTO: 0.04 THOUSANDS/ΜL (ref 0–0.1)
BASOPHILS NFR BLD AUTO: 1 % (ref 0–1)
BILIRUB SERPL-MCNC: 0.83 MG/DL (ref 0.2–1)
BUN SERPL-MCNC: 11 MG/DL (ref 5–25)
CALCIUM SERPL-MCNC: 9.1 MG/DL (ref 8.3–10.1)
CHLORIDE SERPL-SCNC: 109 MMOL/L (ref 100–108)
CHOLEST SERPL-MCNC: 195 MG/DL (ref 50–200)
CO2 SERPL-SCNC: 29 MMOL/L (ref 21–32)
CREAT SERPL-MCNC: 1 MG/DL (ref 0.6–1.3)
EOSINOPHIL # BLD AUTO: 0.11 THOUSAND/ΜL (ref 0–0.61)
EOSINOPHIL NFR BLD AUTO: 2 % (ref 0–6)
ERYTHROCYTE [DISTWIDTH] IN BLOOD BY AUTOMATED COUNT: 12.3 % (ref 11.6–15.1)
GFR SERPL CREATININE-BSD FRML MDRD: 88 ML/MIN/1.73SQ M
GLUCOSE P FAST SERPL-MCNC: 87 MG/DL (ref 65–99)
HCT VFR BLD AUTO: 48.1 % (ref 36.5–49.3)
HCV AB SER QL: NORMAL
HDLC SERPL-MCNC: 42 MG/DL
HGB BLD-MCNC: 15.9 G/DL (ref 12–17)
IMM GRANULOCYTES # BLD AUTO: 0.02 THOUSAND/UL (ref 0–0.2)
IMM GRANULOCYTES NFR BLD AUTO: 0 % (ref 0–2)
LDLC SERPL CALC-MCNC: 138 MG/DL (ref 0–100)
LYMPHOCYTES # BLD AUTO: 2.68 THOUSANDS/ΜL (ref 0.6–4.47)
LYMPHOCYTES NFR BLD AUTO: 41 % (ref 14–44)
MCH RBC QN AUTO: 28.9 PG (ref 26.8–34.3)
MCHC RBC AUTO-ENTMCNC: 33.1 G/DL (ref 31.4–37.4)
MCV RBC AUTO: 88 FL (ref 82–98)
MONOCYTES # BLD AUTO: 0.69 THOUSAND/ΜL (ref 0.17–1.22)
MONOCYTES NFR BLD AUTO: 11 % (ref 4–12)
NEUTROPHILS # BLD AUTO: 2.94 THOUSANDS/ΜL (ref 1.85–7.62)
NEUTS SEG NFR BLD AUTO: 45 % (ref 43–75)
NONHDLC SERPL-MCNC: 153 MG/DL
NRBC BLD AUTO-RTO: 0 /100 WBCS
PLATELET # BLD AUTO: 339 THOUSANDS/UL (ref 149–390)
PMV BLD AUTO: 9.7 FL (ref 8.9–12.7)
POTASSIUM SERPL-SCNC: 4.8 MMOL/L (ref 3.5–5.3)
PROT SERPL-MCNC: 7.7 G/DL (ref 6.4–8.2)
RBC # BLD AUTO: 5.5 MILLION/UL (ref 3.88–5.62)
SODIUM SERPL-SCNC: 138 MMOL/L (ref 136–145)
TRIGL SERPL-MCNC: 74 MG/DL
WBC # BLD AUTO: 6.48 THOUSAND/UL (ref 4.31–10.16)

## 2021-10-29 PROCEDURE — 85025 COMPLETE CBC W/AUTO DIFF WBC: CPT

## 2021-10-29 PROCEDURE — 80061 LIPID PANEL: CPT

## 2021-10-29 PROCEDURE — 86803 HEPATITIS C AB TEST: CPT

## 2021-10-29 PROCEDURE — 80053 COMPREHEN METABOLIC PANEL: CPT

## 2021-10-29 PROCEDURE — 36415 COLL VENOUS BLD VENIPUNCTURE: CPT

## 2022-04-22 ENCOUNTER — TELEMEDICINE (OUTPATIENT)
Dept: INTERNAL MEDICINE CLINIC | Facility: CLINIC | Age: 52
End: 2022-04-22
Payer: COMMERCIAL

## 2022-04-22 VITALS — TEMPERATURE: 98 F

## 2022-04-22 DIAGNOSIS — Z20.822 CLOSE EXPOSURE TO COVID-19 VIRUS: Primary | ICD-10-CM

## 2022-04-22 PROCEDURE — 99213 OFFICE O/P EST LOW 20 MIN: CPT | Performed by: INTERNAL MEDICINE

## 2022-04-22 NOTE — PROGRESS NOTES
Virtual Regular Visit    Verification of patient location:    Patient is located in the following state in which I hold an active license [place]      Assessment/Plan:    Problem List Items Addressed This Visit        Other    Close exposure to COVID-19 virus - Primary     Pt reports multiple sick contacts w/in past week, some of whom have tested positive for COVID-19  Pt is unvaccinated and does not wish to be vaccinated  Believes that he had COVID infection in early 2020 before testing was available  He experienced aches and pains in the bilateral LEs and low back yesterday after a long drive, but is not currently experiencing any symptoms  Focused history is reassuring that bilateral diffuse dull ache in the lower extremities is highly unlikely to be due to DVT  No CP or SOB to suggest PE--informed pt to call office or present to ED if he should develop either  Pt would like to know what pattern of symptoms to expect regarding infection with current predominant strain of COVID--discussed with pt that symptoms are highly variable among individuals, but that best predictor of disease severity is vaccination status  Reviewed with pt current CDC guidance regarding self-isolation after exposure, masking when around others  Reviewed with pt indications to call the office or present to the ED, including development of high fever or SOB  No indication for medical therapy at this time  Recommend supportive care  Reason for visit is   Chief Complaint   Patient presents with    Virtual Brief Visit     pt was in contact with daughter of a friend who tested positive for covid   pt was in [place] for work pt woke up yesterday with body aches slight headache and skin sensitivity no vaccine pt thinks he had covid in the very beginning when there was no testing   [address]     pt would not like to have colonoscopy done        Encounter provider Elli Hunter MD    Provider located at [facility] in [place] [facility] [place]  [address] [facility] [id number]      Recent Visits  No visits were found meeting these conditions  Showing recent visits within past 7 days and meeting all other requirements  Today's Visits  Date Type Provider Dept   04/22/22 Telemedicine Garrett Srinivasan MD Pending sale to [facility]   Showing today's visits and meeting all other requirements  Future Appointments  No visits were found meeting these conditions  Showing future appointments within next 150 days and meeting all other requirements       The patient was identified by name and date of birth  Susu Galicia was informed that this is a telemedicine visit and that the visit is being conducted through [address] [place] [address] Road [facility] and patient was informed that this is a secure, HIPAA-compliant platform  He agrees to proceed     My office door was closed  No one else was in the room  He acknowledged consent and understanding of privacy and security of the video platform  The patient has agreed to participate and understands they can discontinue the visit at any time  Patient is aware this is a billable service  Subjective  Luke Crow is a 51 yo M with unremarkable past medical history who is seen via video conference for recent [place] exposure  Pt reports recent confirmed and suspected exposures to sick contacts within the last week, some of whom have tested positive for COVID  Pt works as a  and reports waking yesterday with back and bilateral leg pain after a long day of driving the day before  States that back pain was not unusual but that leg pain was   Pain in legs is described as bilateral, diffuse, "annoying" but not severe--pt reports that pain has resolved spontaneously, has not developed chest pain or shortness of breath; based on description of pain as bilateral diffuse dull ache, very low suspicion for DVT, and no symptoms to suggest PE  Today, pt feels well, in his usual state of health, and is primarily seeking guidance with respect to what symptoms he should expect if he is infected  Denies fevers, chills, SOB  Occasional non-productive cough stable to baseline  Past Medical History:   Diagnosis Date    Back pain     Poison ivy        History reviewed  No pertinent surgical history  Current Outpatient Medications   Medication Sig Dispense Refill    Ascorbic Acid (VITAMIN C PO) Take by mouth      Multiple Vitamins-Minerals (ZINC PO) Take by mouth      VITAMIN D PO Take by mouth      Albuterol Sulfate (ProAir RespiClick) 165 (90 Base) MCG/ACT AEPB Inhale 1 puff 4 (four) times a day as needed (wheezing or cough) (Patient not taking: Reported on 10/28/2021) 1 each 0    cyclobenzaprine (FLEXERIL) 10 mg tablet Take 1 tablet (10 mg total) by mouth 3 (three) times a day as needed for muscle spasms (Patient not taking: Reported on 10/28/2021) 30 tablet 1    naproxen (NAPROSYN) 500 mg tablet Take 1 tablet (500 mg total) by mouth 2 (two) times a day with meals (Patient not taking: Reported on 4/22/2022 ) 30 tablet 0     No current facility-administered medications for this visit  Allergies   Allergen Reactions    Poison Ivy Extract        Review of Systems   Constitutional: Negative for activity change, appetite change, chills, diaphoresis, fatigue, fever and unexpected weight change  HENT: Negative  Eyes: Negative  Respiratory: Positive for cough (occasional, non-productive, at baseline)  Negative for shortness of breath  Cardiovascular: Negative  Gastrointestinal: Negative  Endocrine: Negative  Genitourinary: Negative  Musculoskeletal: Positive for back pain (chronic intermittent, none at present) and myalgias (b/l diffuse ache affecting the LEs, one-day duration, now resolved)  Skin: Negative  Neurological: Negative  Hematological: Negative          Video Exam    Vitals:    04/22/22 1055   Temp: 98 °F (36 7 °C)       Physical Exam   Physical exam could not be performed due to virtual visit via video conference  Pt is alert, engaged, well-appearing and in no acute distress  I spent 15 minutes directly with the patient during this visit    VIRTUAL VISIT [facility] verbally agrees to participate in [employer]  Pt is aware that [employer] could be limited without vital signs or the ability to perform a full hands-on physical Singh Cocking understands he or the provider may request at any time to terminate the video visit and request the patient to seek care or treatment in person

## 2022-04-22 NOTE — ASSESSMENT & PLAN NOTE
Pt reports multiple sick contacts w/in past week, some of whom have tested positive for COVID-19  Pt is unvaccinated and does not wish to be vaccinated  Believes that he had COVID infection in early 2020 before testing was available  He experienced aches and pains in the bilateral LEs and low back yesterday after a long drive, but is not currently experiencing any symptoms  Focused history is reassuring that bilateral diffuse dull ache in the lower extremities is highly unlikely to be due to DVT  No CP or SOB to suggest PE--informed pt to call office or present to ED if he should develop either  Pt would like to know what pattern of symptoms to expect regarding infection with current predominant strain of COVID--discussed with pt that symptoms are highly variable among individuals, but that best predictor of disease severity is vaccination status  Reviewed with pt current CDC guidance regarding self-isolation after exposure, masking when around others  Reviewed with pt indications to call the office or present to the ED, including development of high fever or SOB  No indication for medical therapy at this time  Recommend supportive care

## 2022-04-28 ENCOUNTER — TELEPHONE (OUTPATIENT)
Dept: INTERNAL MEDICINE CLINIC | Facility: OTHER | Age: 52
End: 2022-04-28

## 2022-04-28 ENCOUNTER — TELEPHONE (OUTPATIENT)
Dept: INTERNAL MEDICINE CLINIC | Facility: CLINIC | Age: 52
End: 2022-04-28

## 2022-04-28 NOTE — LETTER
April 28, 2022     Patient: Serenity Nicole  YOB: 1970  Date of Visit: 4/28/2022      To Whom it May Concern:    Kenneth Arzola is under my professional care  Alicia Barlow was seen in my office on 4/28/2022  Alicia Barlow may return to work on Monday, May 2nd at the earliest, provided that he is both feeling sympomatically improved and has also been free of fever for at least 24 hours without needing to take anti-fever medications such as Tylenol  If you have any questions or concerns, please don't hesitate to call           Sincerely,          Sylvia Caal MD        CC: No Recipients

## 2022-04-28 NOTE — TELEPHONE ENCOUNTER
Spoke with Mr Lana Vargas regarding ongoing symptoms of COVID  Sxs of arthralgias, myalgias, HA  No concerning features to prompt urgent evaluation such as SOB or high fever  Reviewed recommendations for supportive care and indications for urgent evaluation in the ED  Pt requesting work note, which was sent

## 2023-01-19 ENCOUNTER — OFFICE VISIT (OUTPATIENT)
Dept: INTERNAL MEDICINE CLINIC | Facility: OTHER | Age: 53
End: 2023-01-19

## 2023-01-19 VITALS
WEIGHT: 210 LBS | HEIGHT: 75 IN | TEMPERATURE: 97.9 F | SYSTOLIC BLOOD PRESSURE: 128 MMHG | OXYGEN SATURATION: 100 % | DIASTOLIC BLOOD PRESSURE: 78 MMHG | BODY MASS INDEX: 26.11 KG/M2 | HEART RATE: 80 BPM

## 2023-01-19 DIAGNOSIS — M54.9 ACUTE UPPER BACK PAIN: ICD-10-CM

## 2023-01-19 PROBLEM — M75.22 BICEPS TENDINITIS OF LEFT UPPER EXTREMITY: Status: RESOLVED | Noted: 2021-10-28 | Resolved: 2023-01-19

## 2023-01-19 PROBLEM — Z20.822 CLOSE EXPOSURE TO COVID-19 VIRUS: Status: RESOLVED | Noted: 2022-04-22 | Resolved: 2023-01-19

## 2023-01-19 RX ORDER — CYCLOBENZAPRINE HCL 10 MG
10 TABLET ORAL 3 TIMES DAILY PRN
Qty: 30 TABLET | Refills: 1 | Status: SHIPPED | OUTPATIENT
Start: 2023-01-19

## 2023-01-19 RX ORDER — DICLOFENAC SODIUM 75 MG/1
75 TABLET, DELAYED RELEASE ORAL 2 TIMES DAILY PRN
Qty: 30 TABLET | Refills: 0 | Status: SHIPPED | OUTPATIENT
Start: 2023-01-19

## 2023-01-19 NOTE — PROGRESS NOTES
Assessment/Plan:    Acute upper back pain  Continue Flexeril 10 mg 3 times a day as needed for spasms  We will prescribe diclofenac 75 mg twice a day as needed for pain  He may also take Tylenol as needed  Discussed using heating pads, range of motion and stretching exercises  He is to contact our office for worsening symptoms  Diagnoses and all orders for this visit:    Acute upper back pain  -     cyclobenzaprine (FLEXERIL) 10 mg tablet; Take 1 tablet (10 mg total) by mouth 3 (three) times a day as needed for muscle spasms  -     diclofenac (VOLTAREN) 75 mg EC tablet; Take 1 tablet (75 mg total) by mouth 2 (two) times a day as needed (pain ) Do NOT take with other NSAIDs  Subjective:      Patient ID: Melany Carney is a 50 y o  male  Chief Complaint   Patient presents with   • Back Pain     Patient states he is having lower back pain for 2 days    • HM     Depression sc, BMI, COLONOSCOPY- refused, FLU SHOT -REFUSED        50year old male is seen today with concern for left sided low back pain since 2 days  He denies any recent trauma or exertional activity  He took Flexeril which did help some of his pain  Back Pain  This is a new problem  The current episode started in the past 7 days  The problem occurs constantly  The problem is unchanged  The pain is present in the lumbar spine  The pain does not radiate  The symptoms are aggravated by bending, position and twisting  Pertinent negatives include no abdominal pain, chest pain, dysuria, fever, headaches, numbness or weakness  He has tried muscle relaxant for the symptoms  The treatment provided mild relief         The following portions of the patient's history were reviewed and updated as appropriate: allergies, current medications, past family history, past medical history, past social history, past surgical history and problem list     Review of Systems   Constitutional: Negative for activity change, appetite change, chills, diaphoresis, fatigue and fever  HENT: Negative for congestion, postnasal drip, rhinorrhea, sinus pressure, sinus pain, sneezing and sore throat  Eyes: Negative for visual disturbance  Respiratory: Negative for apnea, cough, choking, chest tightness, shortness of breath and wheezing  Cardiovascular: Negative for chest pain, palpitations and leg swelling  Gastrointestinal: Negative for abdominal distention, abdominal pain, anal bleeding, blood in stool, constipation, diarrhea, nausea and vomiting  Endocrine: Negative for cold intolerance and heat intolerance  Genitourinary: Negative for difficulty urinating, dysuria and hematuria  Musculoskeletal: Positive for back pain  Skin: Negative  Neurological: Negative for dizziness, weakness, light-headedness, numbness and headaches  Hematological: Negative for adenopathy  Psychiatric/Behavioral: Negative for agitation, sleep disturbance and suicidal ideas  All other systems reviewed and are negative  Past Medical History:   Diagnosis Date   • Back pain    • Poison ivy          Current Outpatient Medications:   •  Ascorbic Acid (VITAMIN C PO), Take by mouth, Disp: , Rfl:   •  cyclobenzaprine (FLEXERIL) 10 mg tablet, Take 1 tablet (10 mg total) by mouth 3 (three) times a day as needed for muscle spasms, Disp: 30 tablet, Rfl: 1  •  diclofenac (VOLTAREN) 75 mg EC tablet, Take 1 tablet (75 mg total) by mouth 2 (two) times a day as needed (pain ) Do NOT take with other NSAIDs , Disp: 30 tablet, Rfl: 0  •  Multiple Vitamins-Minerals (ZINC PO), Take by mouth, Disp: , Rfl:   •  VITAMIN D PO, Take by mouth, Disp: , Rfl:     Allergies   Allergen Reactions   • Poison Melissa Extract        Social History   History reviewed  No pertinent surgical history    Family History   Problem Relation Age of Onset   • Diabetes Mother    • Diabetes Father        Objective:  /78 (BP Location: Left arm, Patient Position: Sitting, Cuff Size: Large)   Pulse 80 Temp 97 9 °F (36 6 °C) (Temporal)   Ht 6' 3" (1 905 m)   Wt 95 3 kg (210 lb)   SpO2 100%   BMI 26 25 kg/m²     No results found for this or any previous visit (from the past 1344 hour(s))  Physical Exam  Vitals and nursing note reviewed  Constitutional:       General: He is not in acute distress  Appearance: He is well-developed  He is not diaphoretic  HENT:      Head: Normocephalic and atraumatic  Eyes:      General: No scleral icterus  Right eye: No discharge  Left eye: No discharge  Conjunctiva/sclera: Conjunctivae normal       Pupils: Pupils are equal, round, and reactive to light  Neck:      Thyroid: No thyromegaly  Vascular: No JVD  Cardiovascular:      Rate and Rhythm: Normal rate and regular rhythm  Heart sounds: Normal heart sounds  No murmur heard  No friction rub  No gallop  Pulmonary:      Effort: Pulmonary effort is normal  No respiratory distress  Breath sounds: Normal breath sounds  No wheezing or rales  Chest:      Chest wall: No tenderness  Abdominal:      General: Bowel sounds are normal  There is no distension  Palpations: Abdomen is soft  There is no mass  Tenderness: There is no abdominal tenderness  There is no guarding or rebound  Musculoskeletal:         General: No tenderness or deformity  Normal range of motion  Cervical back: Normal range of motion and neck supple  Back:    Lymphadenopathy:      Cervical: No cervical adenopathy  Skin:     General: Skin is warm and dry  Coloration: Skin is not pale  Findings: No erythema or rash  Neurological:      Mental Status: He is alert and oriented to person, place, and time  Cranial Nerves: No cranial nerve deficit  Coordination: Coordination normal       Deep Tendon Reflexes: Reflexes are normal and symmetric  Psychiatric:         Behavior: Behavior normal          Thought Content:  Thought content normal          Judgment: Judgment normal

## 2023-01-19 NOTE — LETTER
January 19, 2023     Patient: Brook Frances  YOB: 1970  Date of Visit: 1/19/2023      To Whom it May Concern:    Layne Kenny is under my professional care  Lizeth Hence was seen in my office on 1/19/2023  Lizeth Hence may return to work on 1/23/2023  If you have any questions or concerns, please don't hesitate to call           Sincerely,          Trinidad Alegria MD

## 2023-01-19 NOTE — ASSESSMENT & PLAN NOTE
Continue Flexeril 10 mg 3 times a day as needed for spasms  We will prescribe diclofenac 75 mg twice a day as needed for pain  He may also take Tylenol as needed  Discussed using heating pads, range of motion and stretching exercises  He is to contact our office for worsening symptoms

## 2023-03-17 ENCOUNTER — OFFICE VISIT (OUTPATIENT)
Dept: INTERNAL MEDICINE CLINIC | Age: 53
End: 2023-03-17

## 2023-03-17 VITALS
TEMPERATURE: 97.7 F | HEART RATE: 88 BPM | DIASTOLIC BLOOD PRESSURE: 68 MMHG | WEIGHT: 213 LBS | SYSTOLIC BLOOD PRESSURE: 126 MMHG | OXYGEN SATURATION: 99 % | HEIGHT: 75 IN | BODY MASS INDEX: 26.49 KG/M2

## 2023-03-17 DIAGNOSIS — M54.42 ACUTE LEFT-SIDED LOW BACK PAIN WITH LEFT-SIDED SCIATICA: Primary | ICD-10-CM

## 2023-03-17 DIAGNOSIS — M62.830 MUSCLE SPASM OF BACK: ICD-10-CM

## 2023-03-17 DIAGNOSIS — Z12.11 SCREENING FOR MALIGNANT NEOPLASM OF COLON: ICD-10-CM

## 2023-03-17 RX ORDER — PREDNISONE 20 MG/1
20 TABLET ORAL DAILY
Qty: 7 TABLET | Refills: 0 | Status: SHIPPED | OUTPATIENT
Start: 2023-03-17

## 2023-03-17 RX ORDER — CYCLOBENZAPRINE HCL 10 MG
10 TABLET ORAL
Qty: 10 TABLET | Refills: 0 | Status: SHIPPED | OUTPATIENT
Start: 2023-03-17

## 2023-03-17 RX ORDER — NAPROXEN 500 MG/1
500 TABLET ORAL 2 TIMES DAILY WITH MEALS
Qty: 40 TABLET | Refills: 0 | Status: SHIPPED | OUTPATIENT
Start: 2023-03-17

## 2023-03-17 NOTE — LETTER
March 17, 2023     Patient: Abel Blackwell  YOB: 1970  Date of Visit: 3/17/2023      To Whom it May Concern:    Cherelle Rothman is under my professional care  Lalito Hines was seen in my office on 3/17/2023  Lalito Hines may return to work on 03/22/2023  If you have any questions or concerns, please don't hesitate to call           Sincerely,          Gabriela Stephenson,         CC: No Recipients

## 2023-03-17 NOTE — PROGRESS NOTES
Assessment/Plan:    Acute left-sided low back pain with sciatica muscle spasm  - We will start patient on naproxen 500 mg twice daily with meals as needed  -We will also start him on prednisone 20 mg daily for 7 days and cyclobenzaprine 10 mg at nighttime   -Patient was counseled to take the prednisone with food as well as the naproxen  -He was counseled to avoid over stressing his back  -We will give him a work note excusing him from work till Wednesday, 3/22/2023 since he tends to overdo things as a      Diagnoses and all orders for this visit:    Acute left-sided low back pain with left-sided sciatica  -     naproxen (Naprosyn) 500 mg tablet; Take 1 tablet (500 mg total) by mouth 2 (two) times a day with meals  -     cyclobenzaprine (FLEXERIL) 10 mg tablet; Take 1 tablet (10 mg total) by mouth daily at bedtime  -     predniSONE 20 mg tablet; Take 1 tablet (20 mg total) by mouth daily    Screening for malignant neoplasm of colon  -     Cologuard    Muscle spasm of back  -     cyclobenzaprine (FLEXERIL) 10 mg tablet; Take 1 tablet (10 mg total) by mouth daily at bedtime    BMI 26 0-26 9,adult      BMI Counseling: Body mass index is 26 62 kg/m²  The BMI is above normal  Nutrition recommendations include consuming healthier snacks, limiting drinks that contain sugar, reducing intake of saturated and trans fat and reducing intake of cholesterol  Exercise recommendations include moderate physical activity 150 minutes/week  No pharmacotherapy was ordered  Patient referred to PCP  Rationale for BMI follow-up plan is due to patient being overweight or obese  Subjective:      Patient ID: Dagmar Irwin is a 48 y o  male  HPI  Patient presents with his partner with complaints that he has been haivng pain from the left gluteal region radiating down to the left calf x 2 days  He is a  and states that he slept on a new mattress and the pain started    Pain is sharp and grade 10/10  Has been taking medications prescribed in and out of [facility]   Took tylenol and flexeril and it helped   He drove home 10 hours and pain is now worse  He states that he cannot sit for too long or walk for too long and stand for too long  He denies any mus weakness, numbness, urinary or fecal incontinence or retention  He denies any history of fall, trauma or motor vehicle accident prior to onset of pain  Denies fever, chills, night sweats, headache, dizziness, chest pain, shortness of breath, palpitations, nausea, vomiting, abdominal pain, diarrhea, constipation, myalgias  The following portions of the patient's history were reviewed and updated as appropriate:   He  has a past medical history of Back pain and Poison ivy  He   Patient Active Problem List    Diagnosis Date Noted   • Acute left-sided low back pain with left-sided sciatica 03/17/2023   • Muscle spasm of back 03/17/2023   • BMI 26 0-26 9,adult 03/17/2023   • Acute upper back pain 01/19/2023   • Screening for malignant neoplasm of colon 10/12/2020     He  has no past surgical history on file  His family history includes Diabetes in his father and mother  He  reports that he has been smoking cigarettes  He started smoking about 31 years ago  He has a 45 00 pack-year smoking history  He has never used smokeless tobacco  He reports current alcohol use  He reports that he does not use drugs    Current Outpatient Medications   Medication Sig Dispense Refill   • Ascorbic Acid (VITAMIN C PO) Take by mouth     • cyclobenzaprine (FLEXERIL) 10 mg tablet Take 1 tablet (10 mg total) by mouth daily at bedtime 10 tablet 0   • Multiple Vitamins-Minerals (ZINC PO) Take by mouth     • naproxen (Naprosyn) 500 mg tablet Take 1 tablet (500 mg total) by mouth 2 (two) times a day with meals 40 tablet 0   • predniSONE 20 mg tablet Take 1 tablet (20 mg total) by mouth daily 7 tablet 0   • VITAMIN D PO Take by mouth       No current facility-administered medications for this visit  Current Outpatient Medications on File Prior to Visit   Medication Sig   • Ascorbic Acid (VITAMIN C PO) Take by mouth   • Multiple Vitamins-Minerals (ZINC PO) Take by mouth   • VITAMIN D PO Take by mouth   • [DISCONTINUED] cyclobenzaprine (FLEXERIL) 10 mg tablet Take 1 tablet (10 mg total) by mouth 3 (three) times a day as needed for muscle spasms   • [DISCONTINUED] diclofenac (VOLTAREN) 75 mg EC tablet Take 1 tablet (75 mg total) by mouth 2 (two) times a day as needed (pain ) Do NOT take with other NSAIDs  (Patient not taking: Reported on 3/17/2023)     No current facility-administered medications on file prior to visit  He is allergic to poison ivy extract       Review of Systems   Constitutional: Negative for activity change, chills, fatigue, fever and unexpected weight change  HENT: Negative for ear pain, postnasal drip, rhinorrhea, sinus pressure and sore throat  Eyes: Negative for pain  Respiratory: Negative for cough, choking, chest tightness, shortness of breath and wheezing  Cardiovascular: Negative for chest pain, palpitations and leg swelling  Gastrointestinal: Negative for abdominal pain, constipation, diarrhea, nausea and vomiting  Genitourinary: Negative for dysuria and hematuria  Musculoskeletal: Positive for back pain (Severe left-sided lower back pain radiating down to the left calf ) and gait problem  Negative for arthralgias, joint swelling, myalgias and neck stiffness  Skin: Negative for pallor and rash  Neurological: Negative for dizziness, tremors, seizures, syncope, light-headedness and headaches  Hematological: Negative for adenopathy  Psychiatric/Behavioral: Negative for behavioral problems           Objective:      /68 (BP Location: Left arm, Patient Position: Sitting, Cuff Size: Standard)   Pulse 88   Temp 97 7 °F (36 5 °C) (Temporal)   Ht 6' 3" (1 905 m)   Wt 96 6 kg (213 lb)   SpO2 99%   BMI 26 62 kg/m²          Physical Exam  Constitutional:       General: He is not in acute distress  Appearance: He is well-developed  He is not diaphoretic  HENT:      Head: Normocephalic and atraumatic  Right Ear: External ear normal       Left Ear: External ear normal       Nose: Nose normal       Mouth/Throat:      Mouth: Mucous membranes are dry  Pharynx: No oropharyngeal exudate  Comments: Very dry mucous membranes  Eyes:      General: No scleral icterus  Right eye: No discharge  Left eye: No discharge  Conjunctiva/sclera: Conjunctivae normal       Pupils: Pupils are equal, round, and reactive to light  Neck:      Thyroid: No thyromegaly  Vascular: No JVD  Trachea: No tracheal deviation  Cardiovascular:      Rate and Rhythm: Normal rate and regular rhythm  Heart sounds: Normal heart sounds  No murmur heard  No friction rub  No gallop  Pulmonary:      Effort: Pulmonary effort is normal  No respiratory distress  Breath sounds: Normal breath sounds  No wheezing or rales  Chest:      Chest wall: No tenderness  Abdominal:      General: Bowel sounds are normal  There is no distension  Palpations: Abdomen is soft  There is no mass  Tenderness: There is no abdominal tenderness  There is no guarding or rebound  Musculoskeletal:         General: No deformity  Cervical back: Normal range of motion and neck supple  Lumbar back: Tenderness (Tenderness in the left gluteal region with positive straight leg raise test on flexion of the left foot up to 35 degrees with worsening pain on dorsiflexion of the left foot at 30 degrees of hip flexion on the left) present  Positive left straight leg raise test         Back:         Legs:    Lymphadenopathy:      Cervical: No cervical adenopathy  Skin:     General: Skin is warm and dry  Coloration: Skin is not pale  Findings: No erythema or rash     Neurological:      Mental Status: He is alert and oriented to person, place, and time  Cranial Nerves: No cranial nerve deficit  Motor: No abnormal muscle tone  Coordination: Coordination normal       Deep Tendon Reflexes: Reflexes are normal and symmetric  Psychiatric:         Behavior: Behavior normal            No visits with results within 12 Month(s) from this visit  Latest known visit with results is:   Appointment on 10/29/2021   Component Date Value Ref Range Status   • Cholesterol 10/29/2021 195  50 - 200 mg/dL Final    Cholesterol:       Desirable         <200 mg/dl       Borderline         200-239 mg/dl       High              >239 mg/dl          • Triglycerides 10/29/2021 74  <=150 mg/dL Final    Triglyceride:     Normal          <150 mg/dl     Borderline High 150-199 mg/dl     High            200-499 mg/dl        Very High       >499 mg/dl    Specimen collection should occur prior to N-Acetylcysteine or Metamizole administration due to the potential for falsely depressed results  • HDL, Direct 10/29/2021 42  >=40 mg/dL Final    Specimen collection should occur prior to Metamizole administration due to the potential for falsley depressed results  • LDL Calculated 10/29/2021 138 (H)  0 - 100 mg/dL Final    LDL Cholesterol:     Optimal           <100 mg/dl     Near Optimal      100-129 mg/dl     Above Optimal       Borderline High 130-159 mg/dl       High            160-189 mg/dl       Very High       >189 mg/dl         This screening LDL is a calculated result  It does not have the accuracy of the Direct Measured LDL in the monitoring of patients with hyperlipidemia and/or statin therapy  Direct Measure LDL (NZH448) must be ordered separately in these patients     • Non-HDL-Chol (CHOL-HDL) 10/29/2021 153  mg/dl Final   • Sodium 10/29/2021 138  136 - 145 mmol/L Final   • Potassium 10/29/2021 4 8  3 5 - 5 3 mmol/L Final   • Chloride 10/29/2021 109 (H)  100 - 108 mmol/L Final   • CO2 10/29/2021 29  21 - 32 mmol/L Final   • ANION GAP 10/29/2021 0 (L)  4 - 13 mmol/L Final   • BUN 10/29/2021 11  5 - 25 mg/dL Final   • Creatinine 10/29/2021 1 00  0 60 - 1 30 mg/dL Final    Standardized to IDMS reference method   • Glucose, Fasting 10/29/2021 87  65 - 99 mg/dL Final    Specimen collection should occur prior to Sulfasalazine administration due to the potential for falsely depressed results  Specimen collection should occur prior to Sulfapyridine administration due to the potential for falsely elevated results  • Calcium 10/29/2021 9 1  8 3 - 10 1 mg/dL Final   • AST 10/29/2021 18  5 - 45 U/L Final    Specimen collection should occur prior to Sulfasalazine administration due to the potential for falsely depressed results  • ALT 10/29/2021 36  12 - 78 U/L Final    Specimen collection should occur prior to Sulfasalazine and/or Sulfapyridine administration due to the potential for falsely depressed results  • Alkaline Phosphatase 10/29/2021 75  46 - 116 U/L Final   • Total Protein 10/29/2021 7 7  6 4 - 8 2 g/dL Final   • Albumin 10/29/2021 4 0  3 5 - 5 0 g/dL Final   • Total Bilirubin 10/29/2021 0 83  0 20 - 1 00 mg/dL Final    Use of this assay is not recommended for patients undergoing treatment with eltrombopag due to the potential for falsely elevated results     • eGFR 10/29/2021 88  ml/min/1 73sq m Final   • WBC 10/29/2021 6 48  4 31 - 10 16 Thousand/uL Final   • RBC 10/29/2021 5 50  3 88 - 5 62 Million/uL Final   • Hemoglobin 10/29/2021 15 9  12 0 - 17 0 g/dL Final   • Hematocrit 10/29/2021 48 1  36 5 - 49 3 % Final   • MCV 10/29/2021 88  82 - 98 fL Final   • MCH 10/29/2021 28 9  26 8 - 34 3 pg Final   • MCHC 10/29/2021 33 1  31 4 - 37 4 g/dL Final   • RDW 10/29/2021 12 3  11 6 - 15 1 % Final   • MPV 10/29/2021 9 7  8 9 - 12 7 fL Final   • Platelets 90/29/1879 339  149 - 390 Thousands/uL Final   • nRBC 10/29/2021 0  /100 WBCs Final   • Neutrophils Relative 10/29/2021 45  43 - 75 % Final   • Immat GRANS % 10/29/2021 0  0 - 2 % Final   • Lymphocytes Relative 10/29/2021 41  14 - 44 % Final   • Monocytes Relative 10/29/2021 11  4 - 12 % Final   • Eosinophils Relative 10/29/2021 2  0 - 6 % Final   • Basophils Relative 10/29/2021 1  0 - 1 % Final   • Neutrophils Absolute 10/29/2021 2 94  1 85 - 7 62 Thousands/µL Final   • Immature Grans Absolute 10/29/2021 0 02  0 00 - 0 20 Thousand/uL Final   • Lymphocytes Absolute 10/29/2021 2 68  0 60 - 4 47 Thousands/µL Final   • Monocytes Absolute 10/29/2021 0 69  0 17 - 1 22 Thousand/µL Final   • Eosinophils Absolute 10/29/2021 0 11  0 00 - 0 61 Thousand/µL Final   • Basophils Absolute 10/29/2021 0 04  0 00 - 0 10 Thousands/µL Final   • Hepatitis C Ab 10/29/2021 Non-reactive  Non-reactive Final

## 2023-03-20 ENCOUNTER — HOSPITAL ENCOUNTER (OUTPATIENT)
Dept: RADIOLOGY | Facility: IMAGING CENTER | Age: 53
Discharge: HOME | End: 2023-03-20

## 2023-03-20 ENCOUNTER — OFFICE VISIT (OUTPATIENT)
Dept: INTERNAL MEDICINE CLINIC | Facility: OTHER | Age: 53
End: 2023-03-20

## 2023-03-20 VITALS
TEMPERATURE: 98.2 F | SYSTOLIC BLOOD PRESSURE: 152 MMHG | BODY MASS INDEX: 26.49 KG/M2 | OXYGEN SATURATION: 98 % | WEIGHT: 213 LBS | HEART RATE: 115 BPM | DIASTOLIC BLOOD PRESSURE: 98 MMHG | HEIGHT: 75 IN

## 2023-03-20 DIAGNOSIS — M54.42 ACUTE LEFT-SIDED LOW BACK PAIN WITH LEFT-SIDED SCIATICA: ICD-10-CM

## 2023-03-20 DIAGNOSIS — M54.42 ACUTE LEFT-SIDED LOW BACK PAIN WITH LEFT-SIDED SCIATICA: Primary | ICD-10-CM

## 2023-03-20 RX ORDER — METHOCARBAMOL 750 MG/1
750 TABLET, FILM COATED ORAL EVERY 6 HOURS PRN
Qty: 30 TABLET | Refills: 1 | Status: SHIPPED | OUTPATIENT
Start: 2023-03-20

## 2023-03-20 NOTE — PROGRESS NOTES
Assessment/Plan:     Diagnoses and all orders for this visit:    Acute left-sided low back pain with left-sided sciatica  -     XR spine lumbar minimum 4 views non injury; Future  -     Cancel: Ambulatory Referral to Pain Management; Future  -     Ambulatory Referral to Physical Therapy; Future  -     methocarbamol (Robaxin-750) 750 mg tablet; Take 1 tablet (750 mg total) by mouth every 6 (six) hours as needed for muscle spasms    I will put him on rest for at least the whole week I change his muscle relaxer he will continue with his Naprosyn and the prednisone x-ray of the lumbosacral spine was ordered he may need the MRI if the pain is not better and also I will start him on physical therapy if the pain is not better he may need to see the pain management doctor 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         M*Modal software was used to dictate this note  It may contain errors with dictating incorrect words or incorrect spelling  Please contact the provider directly with any questions  Subjective:   Chief Complaint   Patient presents with   • Sciatica     Left sided sciatica pain  On going pain sameer left back and leg  On going x 1-2 weeks   Continues with Flexeril, Naproxen not helping, Prednisone 20 mg only 2 left      •      Has cologuard box at home         Patient ID: Dale Goodrich is a 48 y o  male      HPI  48 years young gentleman he is a  presented with a chief complaint of left lower back pain almost in the buttock area radiated to the left leg on a scale of 10 it is about 8 also causing the numbness goes all the way to the leg there is no weakness of the leg but pain when he tried to ambulate or tried to lay down or try to get up from the chair or the bed,  Patient was working on his truck which breakdown and then was seen in the emergency room for severe back pain at that time no x-rays or anything was done and patient was sent home he drove 10 hours to come back to the hometown and he was seen in our office on Friday he was a started on the prednisone and the pain medication which did not help much he did not have any x-ray of back is back  The following portions of the patient's history were reviewed and updated as appropriate: allergies, current medications, past family history, past medical history, past social history, past surgical history and problem list     Review of Systems   Constitutional: Negative for appetite change, chills, fatigue and fever  HENT: Negative for congestion, ear pain, hearing loss, nosebleeds, sneezing, sore throat, tinnitus and voice change  Eyes: Negative for pain, discharge, redness and visual disturbance  Respiratory: Negative for cough, chest tightness, shortness of breath and wheezing  Cardiovascular: Negative for chest pain, palpitations and leg swelling  Gastrointestinal: Positive for constipation  Negative for abdominal pain, blood in stool, diarrhea, nausea and vomiting  Genitourinary: Negative for difficulty urinating, dysuria, hematuria and urgency  Musculoskeletal: Positive for back pain (radiate to Left leg)  Negative for arthralgias, gait problem and joint swelling  Skin: Negative for color change, rash and wound  Allergic/Immunologic: Negative for environmental allergies  Neurological: Negative for dizziness, tremors, seizures, syncope, weakness, light-headedness and numbness  Hematological: Negative for adenopathy  Does not bruise/bleed easily  Psychiatric/Behavioral: Negative for behavioral problems and confusion  The patient is not nervous/anxious  All other systems reviewed and are negative          Past Medical History:   Diagnosis Date   • Back pain    • Poison ivy          Current Outpatient Medications:   •  Ascorbic Acid (VITAMIN C PO), Take by mouth, Disp: , Rfl:   •  cyclobenzaprine (FLEXERIL) 10 mg tablet, Take 1 tablet (10 mg total) by mouth daily at bedtime, Disp: 10 tablet, Rfl: 0  •  Multiple Vitamins-Minerals (ZINC PO), Take by mouth, Disp: , Rfl:   •  naproxen (Naprosyn) 500 mg tablet, Take 1 tablet (500 mg total) by mouth 2 (two) times a day with meals, Disp: 40 tablet, Rfl: 0  •  predniSONE 20 mg tablet, Take 1 tablet (20 mg total) by mouth daily, Disp: 7 tablet, Rfl: 0  •  VITAMIN D PO, Take by mouth, Disp: , Rfl:     Allergies   Allergen Reactions   • Poison Melissa Extract        Social History   History reviewed  No pertinent surgical history  Family History   Problem Relation Age of Onset   • Diabetes Mother    • Diabetes Father        Objective:  /98 (BP Location: Left arm, Patient Position: Sitting, Cuff Size: Adult)   Pulse (!) 115   Temp 98 2 °F (36 8 °C) (Temporal)   Ht 6' 3" (1 905 m)   Wt 96 6 kg (213 lb)   SpO2 98%   BMI 26 62 kg/m²        Physical Exam  Constitutional:       Appearance: He is well-developed  HENT:      Right Ear: External ear normal    Eyes:      Conjunctiva/sclera: Conjunctivae normal       Pupils: Pupils are equal, round, and reactive to light  Neck:      Thyroid: No thyromegaly  Vascular: No JVD  Cardiovascular:      Rate and Rhythm: Normal rate and regular rhythm  Heart sounds: Normal heart sounds  Pulmonary:      Breath sounds: Normal breath sounds  Abdominal:      General: Bowel sounds are normal       Palpations: Abdomen is soft  Musculoskeletal:      Cervical back: Normal range of motion  Lumbar back: Swelling, spasms and tenderness present  Positive right straight leg raise test    Lymphadenopathy:      Cervical: No cervical adenopathy  Skin:     General: Skin is dry  Neurological:      General: No focal deficit present  Mental Status: He is alert and oriented to person, place, and time  Mental status is at baseline  Deep Tendon Reflexes: Reflexes are normal and symmetric     Psychiatric:         Behavior: Behavior normal

## 2023-03-21 ENCOUNTER — EVALUATION (OUTPATIENT)
Dept: PHYSICAL THERAPY | Facility: CLINIC | Age: 53
End: 2023-03-21

## 2023-03-21 DIAGNOSIS — M54.42 ACUTE LEFT-SIDED LOW BACK PAIN WITH LEFT-SIDED SCIATICA: Primary | ICD-10-CM

## 2023-03-21 NOTE — PROGRESS NOTES
PT Evaluation     Today's date: 3/21/2023  Patient name: Alvin Mohan  : 1970  MRN: [id number]  Referring provider: Darryn Rosen MD  Dx:   Encounter Diagnosis     ICD-10-CM    1  Acute left-sided low back pain with left-sided sciatica  M54 42 Ambulatory Referral to Physical Therapy                     Assessment/Plan  Mr Betito Robb is a 48 y o  male presenting to outpatient physical therapy with LBP with radicular pain which restricts their ability to participate in ADLs, work, and recreational activities without pain  Functional limitations are result of the following impairments: decreased lumbar mobility, radicular leg pain with an extension preference, sciatic nerve tension, and pain with function  Symptoms are consistent with pathoanatomical diagnosis with lumbar radiculopathy likely of L4/L5  PMH is sig for chronic LBP  No further referral appears necessary at this time based upon examination results    Patient was given the opportunity to ask questions, and all questions were answered to the patient's satisfaction  The patients's greatest concerns are the pain not getting better, fear of not being able to stay active and fear of pain getting worse  Patient appears motivated, agrees with the POC, and is a good candidate for skilled physical therapy at this time  Patient was provided with handout of HEP consisting of lumbar extension and sciatic nerve glides  Symptom irritability: High   Understanding of Dx/Px/POC: good  Prognosis: fair  Negative prognostic indicators include: irritability    Goals  STG (3 weeks)  Pain: Patient will subjectively report maximum pain decreased by 2/10  ROM: Patient will increase lumbar ROM by 25%  Function: Patient will report greater ease of driving for work  Function: Patient increase score on FOTO  by 10 points    LTG (6 weeks)  Pain: Patient will subjectively report less than 2/10 pain with functional activities    ROM: Patient will increase lumbar ROM to at least 50%  Function: Patient will increase score on FOTO to predicted value  or better  Patient will be ind with HEP by [facility] [address] details: Prognosis above is given PT services 2x/week over the next 6 weeks and home program adherence  Patient would benefit from: skilled physical therapy, home exercise program  Referral necessary: no  Planned modality interventions: Hydrocollator packs, Cryotherapy, TENS and Low level laser  Planned therapy interventions: joint mobilization, manual therapy, massage, neuromuscular re-education, patient education, stretching, strengthening, therapeutic activities, therapeutic exercise, home exercise program, functional ROM exercises, gait training, flexibility, balance, abdominal trunk stabilization, motor coordination training, coordination and behavior modification  Frequency: 2x/week for 6 weeks  Duration in visits: 12  Plan of Care beginning date: 3/21/2023   Plan of Care expiration date: 5/2/2023  Treatment plan discussed with: patient    Subjective  IE (3/21/2023): Patient is a 48 y o  male who presents for an initial outpatient physical therapy consultation regarding their radicular LBP  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  Last week his truck broke down, and he had to use a rental  He woke up after sleeping overnight in the rental with severe shooting leg pain  Patient was taken to the ER, given a muscle relaxer and steroid  He notes that he was having a dull ache in his back and buttock for a few weeks prior to the increase in pain  His pain is currently affecting his sleeping, as he is unable to lay flat  2 hours max of sleep at the moment  Etiology: also a week ago     Aggravating factors: sitting, standing for a length of time, laying on back or side  Alleviating factors: moving, changing positions, heat, walking  Functional limitations: pain with moving, unable to work  Consultations: Patient reports that they consulted , and x-rays negative for bony problem  Current treatment: improved a little     Pain  Best: 4/10  Worst: 10/10  Irritability: hours  Location: buttock, lateral side, lateral leg, foot  Quality: constant "hurt"  Progression: mild improvement    Social Support  Employment status: [employer]   Hobbies/Recreational Activities: "too busy"    Diagnostic Tests  X-rays: negative for bony abnormality    Patient Goals for Therapy  "decrease pain and get back to work "    Objective  Observation:  · Gait: antalgic, L stiff legged, decreased weight bearing tolerance    Red Flag screen:  All (-)  Recent fever, chills, or infection:  Hx of IV drug use  Bowel or Bladder changes:  Saddle anesthesia:  Hx of cancer:  Unremitting night or resting pain:  Unexplained weight loss:  Hx of fall:     Neurologic Screen  · Dermatomes: N&T at lateral leg and foot   Shooting pain down posterior and lateral lower leg  · Reflexes: diminished L patellar reflex  · Myotomes: unremarkable    Functional movements  · Transitional movements: Slow and painful    Joint play  Lumbar PA assessment: assessment limited by pain  Thoracic PA assessment: hypomobile    Palpation  Paraspinals: TTP  Gluteals: TTP      ROM    Lumbar  • Flexion:   o ROM: moderately limited  o Repeated motions: peripheralizes  • Extension  o ROM: moderately limited   o Repeated motions: centralizes       Left- PROM Right- PROM   Hip flexion 100 120   Hip extension -10 -5   Hip IR 10 10   Hip ER 45 45   ROM Comments:    Strength     Left Right   Hip flexion 4+ 4+   Hip extension 4+ 4+   Hip ABD 4+ 4+        Knee flexion 5 5   Knee extension 5 5        Ankle DF 5 5   Ankle PF 4+ 4+   Strength comments:     Tests  · SIJ cluster (-)  · Prone instability (-)  · SLR (+)  · X-SLR (-)  · Slump (+)         Precautions: n/a      Manuals 3/21            Lumbar PAs             Sciatic sequence                                       Neuro Re-Ed             DAVID             Lumbar extension standing HEP            Sciatic nerve rex HEP                                                                Ther Ex             Walk                                                                                                        Ther Activity                                       Gait Training                                       Modalities                          IE/HEP JF

## 2023-03-27 ENCOUNTER — TELEPHONE (OUTPATIENT)
Dept: INTERNAL MEDICINE CLINIC | Age: 53
End: 2023-03-27

## 2023-03-27 ENCOUNTER — OFFICE VISIT (OUTPATIENT)
Dept: INTERNAL MEDICINE CLINIC | Age: 53
End: 2023-03-27

## 2023-03-27 VITALS
HEIGHT: 75 IN | TEMPERATURE: 97.2 F | DIASTOLIC BLOOD PRESSURE: 88 MMHG | BODY MASS INDEX: 25.36 KG/M2 | SYSTOLIC BLOOD PRESSURE: 148 MMHG | OXYGEN SATURATION: 99 % | HEART RATE: 98 BPM | WEIGHT: 204 LBS

## 2023-03-27 DIAGNOSIS — M54.42 ACUTE LEFT-SIDED LOW BACK PAIN WITH LEFT-SIDED SCIATICA: Primary | ICD-10-CM

## 2023-03-27 RX ORDER — GABAPENTIN 300 MG/1
300 CAPSULE ORAL
Qty: 30 CAPSULE | Refills: 0 | Status: SHIPPED | OUTPATIENT
Start: 2023-03-27 | End: 2023-03-28

## 2023-03-27 NOTE — PROGRESS NOTES
Assessment/Plan:     Diagnoses and all orders for this visit:    Acute left-sided low back pain with left-sided sciatica  -     gabapentin (Neurontin) 300 mg capsule; Take 1 capsule (300 mg total) by mouth daily at bedtime for 1 day    Continue with the home exercises and also continue with the Robaxin as needed         M*Modal software was used to dictate this note  It may contain errors with dictating incorrect words or incorrect spelling  Please contact the provider directly with any questions  Subjective:   Chief Complaint   Patient presents with   • Sciatica     Follow up- XR done on 3/20/23- Pt states he is still having pain- no improvement- pt is not able to sleep   • Hypertension     Pt states his BP has been high- pt has been monitoring BP at home with wrist cuff   • HM     Pt states he received cologuard kit at home and states he will complete- He states he has a lot going on  Patient ID: Brendan Melendez is a 48 y o  male  HPI  Left lower back pain radiating to the left leg I have seen him in the office last week he is doing better he is able to walk better numbness and tingling is better there is no weakness he went for the physical therapy for only 1 time and his symptoms got worse so he stopped going there but he is doing exercises at home he is not ready to go back to work yet he is a   He took a muscle relaxer which helped him he is not taking any Naprosyn or the prednisone anymore x-ray shows a degenerative disc disease at L5-S1 and the facet joint disease will give him 1 more week off from work and see him next week and go from there    He may need to see the pain management if the symptoms persist  The following portions of the patient's history were reviewed and updated as appropriate: allergies, current medications, past family history, past medical history, past social history, past surgical history and problem list     Review of Systems   Constitutional: Negative for activity change, fatigue and fever  HENT: Negative for congestion, sinus pain and sore throat  Eyes: Negative for discharge  Respiratory: Negative for cough and shortness of breath  Cardiovascular: Negative for chest pain and palpitations  Gastrointestinal: Negative for constipation, diarrhea and nausea  Genitourinary: Negative for hematuria and urgency  Musculoskeletal: Positive for back pain  Negative for gait problem  Neurological: Negative for dizziness, weakness and light-headedness  Psychiatric/Behavioral: Positive for sleep disturbance  The patient is not nervous/anxious  Past Medical History:   Diagnosis Date   • Back pain    • Poison ivy          Current Outpatient Medications:   •  methocarbamol (Robaxin-750) 750 mg tablet, Take 1 tablet (750 mg total) by mouth every 6 (six) hours as needed for muscle spasms, Disp: 30 tablet, Rfl: 1  •  Ascorbic Acid (VITAMIN C PO), Take by mouth (Patient not taking: Reported on 3/27/2023), Disp: , Rfl:   •  cyclobenzaprine (FLEXERIL) 10 mg tablet, Take 1 tablet (10 mg total) by mouth daily at bedtime (Patient not taking: Reported on 3/27/2023), Disp: 10 tablet, Rfl: 0  •  Multiple Vitamins-Minerals (ZINC PO), Take by mouth (Patient not taking: Reported on 3/27/2023), Disp: , Rfl:   •  naproxen (Naprosyn) 500 mg tablet, Take 1 tablet (500 mg total) by mouth 2 (two) times a day with meals (Patient not taking: Reported on 3/27/2023), Disp: 40 tablet, Rfl: 0  •  predniSONE 20 mg tablet, Take 1 tablet (20 mg total) by mouth daily, Disp: 7 tablet, Rfl: 0  •  VITAMIN D PO, Take by mouth (Patient not taking: Reported on 3/27/2023), Disp: , Rfl:     Allergies   Allergen Reactions   • Poison Melissa Extract        Social History   History reviewed  No pertinent surgical history    Family History   Problem Relation Age of Onset   • Diabetes Mother    • Diabetes Father        Objective:  /88 (BP Location: Left arm, Patient Position: Sitting, Cuff Size: "Large)   Pulse 98   Temp (!) 97 2 °F (36 2 °C) (Temporal)   Ht 6' 3\" (1 905 m)   Wt 92 5 kg (204 lb)   SpO2 99% Comment: room air  BMI 25 50 kg/m²        Physical Exam  Constitutional:       Appearance: He is well-developed  Cardiovascular:      Rate and Rhythm: Normal rate and regular rhythm  Heart sounds: Normal heart sounds  Pulmonary:      Effort: Pulmonary effort is normal       Breath sounds: Normal breath sounds  Musculoskeletal:      Lumbar back: Spasms (decrease) present  No swelling, deformity, lacerations or tenderness  Positive right straight leg raise test    Skin:     General: Skin is warm and dry  Neurological:      General: No focal deficit present  Mental Status: He is oriented to person, place, and time  Mental status is at baseline             "

## 2023-03-27 NOTE — LETTER
March 27, 2023     Patient: Adama Merritt  YOB: 1970  Date of Visit: 3/27/2023      To Whom it May Concern:    Clemetine Goltz is under my professional care  Willardothyann Mask was seen in my office on 3/27/2023  Please excuse Dorothyann Mask from 3/17/23 -4/3/23  If you have any questions or concerns, please don't hesitate to call           Sincerely,          Bessy Beal MD        CC: No Recipients

## 2023-03-30 ENCOUNTER — OFFICE VISIT (OUTPATIENT)
Dept: PHYSICAL THERAPY | Facility: CLINIC | Age: 53
End: 2023-03-30

## 2023-03-30 DIAGNOSIS — M54.42 ACUTE LEFT-SIDED LOW BACK PAIN WITH LEFT-SIDED SCIATICA: Primary | ICD-10-CM

## 2023-03-30 NOTE — PROGRESS NOTES
PT Discharge     Today's date: 3/30/2023  Patient name: Harvey Poon  : 1970  MRN: [id number]  Referring provider: Rick Mortensen MD  Dx:   Encounter Diagnosis     ICD-10-CM    1  Acute left-sided low back pain with left-sided sciatica  M54 42 Ambulatory Referral to Physical Therapy          Assessment/Plan  Patient is a 48 y o  male presenting to OP PT lumbar radiculopathy  They have attended 2 visits over 2 weeks  Since starting therapy pt has made the following progress towards goals:  1) Pain: Patient reports that their max pain has decreased to 5/10 from 10/10  2) Range of motion: Patient's lumbar ROM improved by about 25%  3 )Strength: Patient's LE strength has improved to at least 4+/5    Patient symptoms have improved enough that he feels that he is ready to return to work  He continues to have deficits with lumbar ROM, radicular symptoms, neural tension, and leg strength  He would benefit from continuing skilled physical in order to address deficits in order to improve function  However, per patient, he would like DC in order to return to work  Patient was provided a handout with additional exercises  See updated goals below  Goals  STG (3 weeks)  Pain: Patient will subjectively report maximum pain decreased by 2/10- MET  ROM: Patient will increase lumbar ROM by 25%- MEt  Function: Patient will report greater ease of driving for work- MET  Function: Patient increase score on FOTO  by 10 points    LTG (6 weeks)  Pain: Patient will subjectively report less than 2/10 pain with functional activities  progressing  ROM: Patient will increase lumbar ROM to at least 50%- progressing  Function: Patient will increase score on FOTO to predicted value  or better- progressing  Patient will be ind with HEP by [address] [place] [address] [address] details: DC to home in order to return to work     Patient would benefit from: skilled physical therapy, home exercise program  Treatment plan discussed with: "patient    Subjective  UPDATE 3/30/2023: Patient reports that his back and leg pain are feeling better  Patient notes that general movement, walking, and \"arthritis exercises\" that he looked up have helped  He states that he continues to get numbness of his foot with sitting for too long  Patient is looking for exercises to help with the numbness as he goes back to work next week  IE (3/21/2023): Patient is a 48 y o  male who presents for an initial outpatient physical therapy consultation regarding their radicular LBP  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  Last week his truck broke down, and he had to use a rental  He woke up after sleeping overnight in the rental with severe shooting leg pain  Patient was taken to the ER, given a muscle relaxer and steroid  He notes that he was having a dull ache in his back and buttock for a few weeks prior to the increase in pain  His pain is currently affecting his sleeping, as he is unable to lay flat  2 hours max of sleep at the moment  Pain  Best: 1/10 (was 4/10)  Worst: 5/10 (was 10/10)  Irritability: hours  Location: buttock, lateral side, lateral leg, foot  Quality: constant \"hurt\"     Progression: mild improvement    Patient Goals for Therapy  \"decrease pain and get back to work \"    Objective  Observation:  · Gait: antalgic, L stiff legged, decreased weight bearing tolerance  · -improved since IE    Joint play  Lumbar PA assessment: assessment limited by pain  Thoracic PA assessment: hypomobile    Palpation  Paraspinals: TTP  Gluteals: TTP    ROM    Lumbar  • Flexion:   o ROM: moderately limited  o Repeated motions: peripheralizes  • Extension  o ROM: moderately limited   o Repeated motions: centralizes       Left- PROM Right- PROM   Hip flexion 120 (was 100) 120   Hip extension -5 (was 10) -5   Hip IR 10 10   Hip ER 45 45   ROM Comments:    Strength     Left Right   Hip flexion 5 (was 4+) 5 (was 4+)   Hip extension 4+ 4+   Hip ABD 4+ 4+        Knee " "flexion 5 5   Knee extension 5 5        Ankle DF 5 5   Ankle PF 5 (was 4+) 5 (was 4+)   Strength comments:     Tests  · SIJ cluster (-)  · Prone instability (-)  · SLR (+), improved since IE  · X-SLR (-)  · Slump (+)          Precautions: n/a      Manuals 3/21 3/30           Lumbar PAs             Sciatic sequence  JF                                     Neuro Re-Ed             DAVID             Lumbar extension standing HEP 10x5\"           Sciatic nerve glides HEP 10x10\"                                                               Ther Ex             Walk  5 min           LTR  10x10\"           SKTC  5x10\"           Piriformis str  5x10\"           HS 90/90  5x10\"           HS 90/90 with heel pumps  5x10\"                                                  Ther Activity                                       Gait Training                                       Modalities                          IE/HEP JF DC              "

## 2023-04-17 PROBLEM — M54.16 LUMBAR RADICULOPATHY: Status: ACTIVE | Noted: 2023-04-17

## 2023-04-17 PROBLEM — M47.816 FACET ARTHROPATHY, LUMBAR: Status: ACTIVE | Noted: 2023-04-17

## 2023-05-16 PROBLEM — Z12.11 SCREENING FOR MALIGNANT NEOPLASM OF COLON: Status: RESOLVED | Noted: 2020-10-12 | Resolved: 2023-05-16

## 2023-08-14 ENCOUNTER — DOCUMENTATION (OUTPATIENT)
Dept: INTERNAL MEDICINE CLINIC | Facility: OTHER | Age: 53
End: 2023-08-14

## 2023-08-14 ENCOUNTER — OFFICE VISIT (OUTPATIENT)
Dept: INTERNAL MEDICINE CLINIC | Facility: OTHER | Age: 53
End: 2023-08-14
Payer: COMMERCIAL

## 2023-08-14 VITALS
TEMPERATURE: 98.5 F | SYSTOLIC BLOOD PRESSURE: 120 MMHG | HEART RATE: 81 BPM | WEIGHT: 164.2 LBS | OXYGEN SATURATION: 98 % | DIASTOLIC BLOOD PRESSURE: 80 MMHG | BODY MASS INDEX: 20.42 KG/M2 | HEIGHT: 75 IN

## 2023-08-14 DIAGNOSIS — J01.00 ACUTE NON-RECURRENT MAXILLARY SINUSITIS: Primary | ICD-10-CM

## 2023-08-14 PROCEDURE — 99213 OFFICE O/P EST LOW 20 MIN: CPT | Performed by: NURSE PRACTITIONER

## 2023-08-14 RX ORDER — AMOXICILLIN AND CLAVULANATE POTASSIUM 875; 125 MG/1; MG/1
1 TABLET, FILM COATED ORAL EVERY 12 HOURS SCHEDULED
Qty: 14 TABLET | Refills: 0 | Status: SHIPPED | OUTPATIENT
Start: 2023-08-14 | End: 2023-08-21

## 2023-08-14 NOTE — LETTER
August 14, 2023     Patient: Magy Wagner  YOB: 1970  Date of Visit: 8/14/2023      To Whom it May Concern:    Kumar Cai is under my professional care. Pimentel Carrie was seen in my office on 8/14/2023. Please excuse him from work on 8/14-8/15. If you have any questions or concerns, please don't hesitate to call.          Sincerely,          MATTHEW Chawla        CC: No Recipients

## 2023-08-14 NOTE — PROGRESS NOTES
Assessment/Plan:    Acute non-recurrent maxillary sinusitis  Patient deferred COVID testing   Start Augmentin   Rest and fluids advised. Educated that the course of this illness could be 2-4 weeks. Discussed symptomatic relief, such as warm steam inhalations, tylenol/ibuprofen for fevers and body aches, rest, and drink plenty of fluids. Warm salt gargles for sore throat. Discussed red flag signs to go to the ER, such as chest pain or shortness of breath. Return to the office for reevaluation if symptoms worsen or do not improve in 1-2 weeks               Diagnoses and all orders for this visit:    Acute non-recurrent maxillary sinusitis  -     amoxicillin-clavulanate (AUGMENTIN) 875-125 mg per tablet; Take 1 tablet by mouth every 12 (twelve) hours for 7 days          Subjective:      Patient ID: Tyesha Junior is a 48 y.o. male. Patient presents today with cold like symptoms. He reports that his family members are sick   He defers COVID testing   Sinusitis  Associated symptoms include congestion and sinus pressure. Pertinent negatives include no chills, coughing, diaphoresis, ear pain, headaches, neck pain, shortness of breath or sore throat. The following portions of the patient's history were reviewed and updated as appropriate: allergies, current medications, past family history, past medical history, past social history, past surgical history and problem list.    Review of Systems   Constitutional: Positive for fatigue. Negative for activity change, appetite change, chills, diaphoresis and fever. HENT: Positive for congestion, postnasal drip and sinus pressure. Negative for ear discharge, ear pain, rhinorrhea, sinus pain and sore throat. Eyes: Negative for pain, discharge, itching and visual disturbance. Respiratory: Negative for cough, chest tightness, shortness of breath and wheezing. Cardiovascular: Negative for chest pain, palpitations and leg swelling.    Gastrointestinal: Negative for abdominal pain, blood in stool, constipation, diarrhea, nausea and vomiting. Endocrine: Negative for polydipsia, polyphagia and polyuria. Genitourinary: Negative for difficulty urinating, dysuria, hematuria and urgency. Musculoskeletal: Negative for arthralgias, back pain and neck pain. Skin: Negative for rash and wound. Neurological: Negative for dizziness, weakness, numbness and headaches. Past Medical History:   Diagnosis Date   • Back pain    • Poison ivy          Current Outpatient Medications:   •  amoxicillin-clavulanate (AUGMENTIN) 875-125 mg per tablet, Take 1 tablet by mouth every 12 (twelve) hours for 7 days, Disp: 14 tablet, Rfl: 0  •  gabapentin (Neurontin) 300 mg capsule, Take 1 capsule (300 mg total) by mouth daily at bedtime for 1 day (Patient not taking: Reported on 4/17/2023), Disp: 30 capsule, Rfl: 0  •  methocarbamol (Robaxin-750) 750 mg tablet, Take 1 tablet (750 mg total) by mouth every 6 (six) hours as needed for muscle spasms (Patient not taking: Reported on 4/17/2023), Disp: 30 tablet, Rfl: 1  •  VITAMIN D PO, Take by mouth (Patient not taking: Reported on 3/27/2023), Disp: , Rfl:     Allergies   Allergen Reactions   • Poison Melissa Extract        Social History   History reviewed. No pertinent surgical history. Family History   Problem Relation Age of Onset   • Diabetes Mother    • Diabetes Father        Objective:  /80 (BP Location: Left arm, Patient Position: Sitting, Cuff Size: Standard)   Pulse 81   Temp 98.5 °F (36.9 °C) (Temporal)   Ht 6' 3" (1.905 m)   Wt 74.5 kg (164 lb 3.2 oz)   SpO2 98%   BMI 20.52 kg/m²     No results found for this or any previous visit (from the past 1344 hour(s)). Physical Exam  Constitutional:       General: He is not in acute distress. Appearance: He is well-developed. He is not diaphoretic. HENT:      Head: Normocephalic and atraumatic.       Right Ear: External ear normal.      Left Ear: External ear normal.      Nose: Nose normal.      Mouth/Throat:      Pharynx: No oropharyngeal exudate. Eyes:      General:         Right eye: No discharge. Left eye: No discharge. Conjunctiva/sclera: Conjunctivae normal.      Pupils: Pupils are equal, round, and reactive to light. Neck:      Thyroid: No thyromegaly. Cardiovascular:      Rate and Rhythm: Normal rate and regular rhythm. Heart sounds: Normal heart sounds. No murmur heard. No friction rub. No gallop. Pulmonary:      Effort: Pulmonary effort is normal. No respiratory distress. Breath sounds: Normal breath sounds. No stridor. No wheezing or rales. Abdominal:      General: Bowel sounds are normal. There is no distension. Palpations: Abdomen is soft. Tenderness: There is no abdominal tenderness. Musculoskeletal:      Cervical back: Normal range of motion and neck supple. Lymphadenopathy:      Cervical: No cervical adenopathy. Skin:     General: Skin is warm and dry. Findings: No erythema or rash. Neurological:      Mental Status: He is alert and oriented to person, place, and time. Psychiatric:         Behavior: Behavior normal.         Thought Content:  Thought content normal.         Judgment: Judgment normal.

## 2023-08-15 PROBLEM — J01.00 ACUTE NON-RECURRENT MAXILLARY SINUSITIS: Status: ACTIVE | Noted: 2023-08-15

## 2023-08-15 NOTE — ASSESSMENT & PLAN NOTE
Patient deferred COVID testing   Start Augmentin   Rest and fluids advised. Educated that the course of this illness could be 2-4 weeks. Discussed symptomatic relief, such as warm steam inhalations, tylenol/ibuprofen for fevers and body aches, rest, and drink plenty of fluids. Warm salt gargles for sore throat. Discussed red flag signs to go to the ER, such as chest pain or shortness of breath.    Return to the office for reevaluation if symptoms worsen or do not improve in 1-2 weeks

## 2023-10-14 PROBLEM — J01.00 ACUTE NON-RECURRENT MAXILLARY SINUSITIS: Status: RESOLVED | Noted: 2023-08-15 | Resolved: 2023-10-14

## 2024-01-26 ENCOUNTER — OFFICE VISIT (OUTPATIENT)
Dept: INTERNAL MEDICINE CLINIC | Age: 54
End: 2024-01-26
Payer: COMMERCIAL

## 2024-01-26 VITALS
HEART RATE: 73 BPM | WEIGHT: 209 LBS | OXYGEN SATURATION: 97 % | DIASTOLIC BLOOD PRESSURE: 80 MMHG | TEMPERATURE: 97.1 F | HEIGHT: 75 IN | SYSTOLIC BLOOD PRESSURE: 118 MMHG | BODY MASS INDEX: 25.99 KG/M2

## 2024-01-26 DIAGNOSIS — H61.21 IMPACTED CERUMEN OF RIGHT EAR: ICD-10-CM

## 2024-01-26 DIAGNOSIS — M54.16 LUMBAR RADICULOPATHY: Primary | ICD-10-CM

## 2024-01-26 DIAGNOSIS — F17.210 CIGARETTE NICOTINE DEPENDENCE WITHOUT COMPLICATION: ICD-10-CM

## 2024-01-26 DIAGNOSIS — M62.830 MUSCLE SPASM OF BACK: ICD-10-CM

## 2024-01-26 DIAGNOSIS — M51.16 LUMBAR DISC DISEASE WITH RADICULOPATHY: ICD-10-CM

## 2024-01-26 DIAGNOSIS — Z53.20 LUNG CANCER SCREENING DECLINED BY PATIENT: ICD-10-CM

## 2024-01-26 PROCEDURE — 99214 OFFICE O/P EST MOD 30 MIN: CPT | Performed by: INTERNAL MEDICINE

## 2024-01-26 RX ORDER — CYCLOBENZAPRINE HCL 10 MG
10 TABLET ORAL
Qty: 20 TABLET | Refills: 0 | Status: SHIPPED | OUTPATIENT
Start: 2024-01-26

## 2024-01-26 RX ORDER — MELOXICAM 15 MG/1
15 TABLET ORAL DAILY PRN
Qty: 20 TABLET | Refills: 0 | Status: SHIPPED | OUTPATIENT
Start: 2024-01-26

## 2024-01-26 RX ORDER — PREDNISONE 20 MG/1
20 TABLET ORAL DAILY
Qty: 7 TABLET | Refills: 0 | Status: SHIPPED | OUTPATIENT
Start: 2024-01-26

## 2024-01-26 NOTE — PROGRESS NOTES
Assessment/Plan:    Lumbar disc disease with radiculopathy  -Acute on chronic  -Will start patient on prednisone 20 mg daily for 7 days  -Will prescribe meloxicam 15 mg to be taken as needed with meals  -Patient may use moist heat for his back  -We will give him a work note as requested    Muscle spasm of the back  -Will start patient on Flexeril 10 mg to be taken at nighttime to avoid daytime dizziness  -Patient may use moist heat for his back as well  -Avoid overstressing the back    Cerumen impaction, right-sided  -Patient was counseled that he may use Debrox eardrops  -He may return to the office for an ear cleaning and as needed    Nicotine dependence  -Patient was counseled to quit smoking but he declined  -I spent about 3 minutes on smoking station counseling  -Patient is precontemplative  -Lung cancer screen was offered but he declined lung cancer screening     Diagnoses and all orders for this visit:    Lumbar radiculopathy  -     predniSONE 20 mg tablet; Take 1 tablet (20 mg total) by mouth daily  -     meloxicam (Mobic) 15 mg tablet; Take 1 tablet (15 mg total) by mouth daily as needed for moderate pain    Muscle spasm of back  -     cyclobenzaprine (FLEXERIL) 10 mg tablet; Take 1 tablet (10 mg total) by mouth daily at bedtime as needed for muscle spasms  -     meloxicam (Mobic) 15 mg tablet; Take 1 tablet (15 mg total) by mouth daily as needed for moderate pain    Lumbar disc disease with radiculopathy  -     predniSONE 20 mg tablet; Take 1 tablet (20 mg total) by mouth daily  -     meloxicam (Mobic) 15 mg tablet; Take 1 tablet (15 mg total) by mouth daily as needed for moderate pain    Impacted cerumen of right ear  -     carbamide peroxide (DEBROX) 6.5 % otic solution; Administer 5 drops to the right ear 2 (two) times a day Use for 5 days    Cigarette nicotine dependence without complication    Lung cancer screening declined by patient          Depression Screening and Follow-up Plan: Patient was  screened for depression during today's encounter. They screened negative with a PHQ-2 score of 0.    Tobacco Cessation Counseling: Tobacco cessation counseling was provided. The patient is sincerely urged to quit consumption of tobacco. He is not ready to quit tobacco. Patient is precontemplative        Subjective:      Patient ID: Iam Kirby is a 53 y.o. male.    HPI  She presents with c/o of pain in his lower back with radiation down the left leg  for the past week  He has chronic lower back pain with arthrtitis that is aggravated by the weather sometimes.  He states that he  tries not to take anything for his pain but may take ibuprofen and tylenol sometimes.  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  Thinks he slept wrong   Pain is better when he bends forward and worse when he extends his back.  He missed work on Monday and would like an excuse note for work.  He denies any fever, chills, night sweats, headache, dizziness, nasal congestion, runny nose, pnd, sore throat, ear ache, sinus pain or pressure, wheezing, cough, chest pain, sob, palpitations, nausea, vomiting, diarrhea, constipation, hematochezia, hematuria, melena stools, arthralgias, myalgias, feelings of anxiety, depression.      The following portions of the patient's history were reviewed and updated as appropriate: He  has a past medical history of Back pain and Poison ivy.  He   Patient Active Problem List    Diagnosis Date Noted   • Facet arthropathy, lumbar 04/17/2023   • Lumbar radiculopathy 04/17/2023   • Acute left-sided low back pain with left-sided sciatica 03/17/2023   • Muscle spasm of back 03/17/2023   • BMI 26.0-26.9,adult 03/17/2023   • Acute upper  back pain 01/19/2023     He  has no past surgical history on file.  His family history includes Diabetes in his father and mother.  He  reports that he has been smoking cigarettes. He started smoking about 32 years ago. He has a 48.4 pack-year smoking history. He has never used smokeless tobacco. He reports current alcohol use. He reports that he does not use drugs.  Current Outpatient Medications   Medication Sig Dispense Refill   • carbamide peroxide (DEBROX) 6.5 % otic solution Administer 5 drops to the right ear 2 (two) times a day Use for 5 days 15 mL 0   • cyclobenzaprine (FLEXERIL) 10 mg tablet Take 1 tablet (10 mg total) by mouth daily at bedtime as needed for muscle spasms 20 tablet 0   • meloxicam (Mobic) 15 mg tablet Take 1 tablet (15 mg total) by mouth daily as needed for moderate pain 20 tablet 0   • predniSONE 20 mg tablet Take 1 tablet (20 mg total) by mouth daily 7 tablet 0   • VITAMIN D PO Take by mouth     • gabapentin (Neurontin) 300 mg capsule Take 1 capsule (300 mg total) by mouth daily at bedtime for 1 day 30 capsule 0     No current facility-administered medications for this visit.     Current Outpatient Medications on File Prior to Visit   Medication Sig   • VITAMIN D PO Take by mouth   • gabapentin (Neurontin) 300 mg capsule Take 1 capsule (300 mg total) by mouth daily at bedtime for 1 day   • [DISCONTINUED] methocarbamol (Robaxin-750) 750 mg tablet Take 1 tablet (750 mg total) by mouth every 6 (six) hours as needed for muscle spasms     No current facility-administered medications on file prior to visit.     He is allergic to poison ivy extract..    Review of Systems   Constitutional:  Negative for activity change, chills, fatigue, fever and unexpected weight change.   HENT:  Negative for ear pain, postnasal drip, rhinorrhea, sinus pressure and sore throat.    Eyes:  Negative for pain.   Respiratory:  Negative for cough, choking, chest tightness, shortness of breath and wheezing.   "  Cardiovascular:  Negative for chest pain, palpitations and leg swelling.   Gastrointestinal:  Negative for abdominal pain, constipation, diarrhea, nausea and vomiting.   Genitourinary:  Negative for dysuria and hematuria.   Musculoskeletal:  Positive for back pain. Negative for arthralgias, gait problem, joint swelling, myalgias and neck stiffness.   Skin:  Negative for pallor and rash.   Neurological:  Negative for dizziness, tremors, seizures, syncope, light-headedness and headaches.   Hematological:  Negative for adenopathy.   Psychiatric/Behavioral:  Positive for sleep disturbance. Negative for behavioral problems.          Objective:      /80 (BP Location: Left arm, Patient Position: Sitting, Cuff Size: Large)   Pulse 73   Temp (!) 97.1 °F (36.2 °C) (Temporal)   Ht 6' 3\" (1.905 m)   Wt 94.8 kg (209 lb)   SpO2 97% Comment: room air  BMI 26.12 kg/m²          Physical Exam  Constitutional:       General: He is not in acute distress.     Appearance: He is well-developed. He is not diaphoretic.   HENT:      Head: Normocephalic and atraumatic.      Right Ear: External ear normal. There is impacted cerumen.      Left Ear: External ear normal.      Nose: Nose normal.      Mouth/Throat:      Mouth: Mucous membranes are dry.      Pharynx: Posterior oropharyngeal erythema present. No oropharyngeal exudate.   Eyes:      General: No scleral icterus.        Right eye: No discharge.         Left eye: No discharge.      Conjunctiva/sclera: Conjunctivae normal.      Pupils: Pupils are equal, round, and reactive to light.   Neck:      Thyroid: No thyromegaly.      Vascular: No JVD.      Trachea: No tracheal deviation.   Cardiovascular:      Rate and Rhythm: Normal rate and regular rhythm.      Heart sounds: Normal heart sounds. No murmur heard.     No friction rub. No gallop.   Pulmonary:      Effort: Pulmonary effort is normal. No respiratory distress.      Breath sounds: Normal breath sounds. No wheezing or " rales.   Chest:      Chest wall: No tenderness.   Abdominal:      General: Bowel sounds are normal. There is no distension.      Palpations: Abdomen is soft. There is no mass.      Tenderness: There is no abdominal tenderness. There is no guarding or rebound.   Musculoskeletal:         General: No deformity. Normal range of motion.      Cervical back: Normal range of motion and neck supple.      Lumbar back: Spasms and tenderness (Tenderness in the lower lumbar region with negative straight leg raise test, tenderness is worse with flexion) present.   Lymphadenopathy:      Cervical: No cervical adenopathy.   Skin:     General: Skin is warm and dry.      Coloration: Skin is not pale.      Findings: No erythema or rash.   Neurological:      Mental Status: He is alert and oriented to person, place, and time.      Cranial Nerves: No cranial nerve deficit.      Motor: No abnormal muscle tone.      Coordination: Coordination normal.      Deep Tendon Reflexes: Reflexes are normal and symmetric.   Psychiatric:         Behavior: Behavior normal.           No visits with results within 12 Month(s) from this visit.   Latest known visit with results is:   Appointment on 10/29/2021   Component Date Value Ref Range Status   • Cholesterol 10/29/2021 195  50 - 200 mg/dL Final    Cholesterol:       Desirable         <200 mg/dl       Borderline         200-239 mg/dl       High              >239 mg/dl          • Triglycerides 10/29/2021 74  <=150 mg/dL Final    Triglyceride:     Normal          <150 mg/dl     Borderline High 150-199 mg/dl     High            200-499 mg/dl        Very High       >499 mg/dl    Specimen collection should occur prior to N-Acetylcysteine or Metamizole administration due to the potential for falsely depressed results.   • HDL, Direct 10/29/2021 42  >=40 mg/dL Final    Specimen collection should occur prior to Metamizole administration due to the potential for falsley depressed results.   • LDL Calculated  10/29/2021 138 (H)  0 - 100 mg/dL Final    LDL Cholesterol:     Optimal           <100 mg/dl     Near Optimal      100-129 mg/dl     Above Optimal       Borderline High 130-159 mg/dl       High            160-189 mg/dl       Very High       >189 mg/dl         This screening LDL is a calculated result.   It does not have the accuracy of the Direct Measured LDL in the monitoring of patients with hyperlipidemia and/or statin therapy.   Direct Measure LDL (HKM165) must be ordered separately in these patients.   • Non-HDL-Chol (CHOL-HDL) 10/29/2021 153  mg/dl Final   • Sodium 10/29/2021 138  136 - 145 mmol/L Final   • Potassium 10/29/2021 4.8  3.5 - 5.3 mmol/L Final   • Chloride 10/29/2021 109 (H)  100 - 108 mmol/L Final   • CO2 10/29/2021 29  21 - 32 mmol/L Final   • ANION GAP 10/29/2021 0 (L)  4 - 13 mmol/L Final   • BUN 10/29/2021 11  5 - 25 mg/dL Final   • Creatinine 10/29/2021 1.00  0.60 - 1.30 mg/dL Final    Standardized to IDMS reference method   • Glucose, Fasting 10/29/2021 87  65 - 99 mg/dL Final    Specimen collection should occur prior to Sulfasalazine administration due to the potential for falsely depressed results. Specimen collection should occur prior to Sulfapyridine administration due to the potential for falsely elevated results.   • Calcium 10/29/2021 9.1  8.3 - 10.1 mg/dL Final   • AST 10/29/2021 18  5 - 45 U/L Final    Specimen collection should occur prior to Sulfasalazine administration due to the potential for falsely depressed results.    • ALT 10/29/2021 36  12 - 78 U/L Final    Specimen collection should occur prior to Sulfasalazine and/or Sulfapyridine administration due to the potential for falsely depressed results.    • Alkaline Phosphatase 10/29/2021 75  46 - 116 U/L Final   • Total Protein 10/29/2021 7.7  6.4 - 8.2 g/dL Final   • Albumin 10/29/2021 4.0  3.5 - 5.0 g/dL Final   • Total Bilirubin 10/29/2021 0.83  0.20 - 1.00 mg/dL Final    Use of this assay is not recommended for patients  undergoing treatment with eltrombopag due to the potential for falsely elevated results.   • eGFR 10/29/2021 88  ml/min/1.73sq m Final   • WBC 10/29/2021 6.48  4.31 - 10.16 Thousand/uL Final   • RBC 10/29/2021 5.50  3.88 - 5.62 Million/uL Final   • Hemoglobin 10/29/2021 15.9  12.0 - 17.0 g/dL Final   • Hematocrit 10/29/2021 48.1  36.5 - 49.3 % Final   • MCV 10/29/2021 88  82 - 98 fL Final   • MCH 10/29/2021 28.9  26.8 - 34.3 pg Final   • MCHC 10/29/2021 33.1  31.4 - 37.4 g/dL Final   • RDW 10/29/2021 12.3  11.6 - 15.1 % Final   • MPV 10/29/2021 9.7  8.9 - 12.7 fL Final   • Platelets 10/29/2021 339  149 - 390 Thousands/uL Final   • nRBC 10/29/2021 0  /100 WBCs Final   • Neutrophils Relative 10/29/2021 45  43 - 75 % Final   • Immat GRANS % 10/29/2021 0  0 - 2 % Final   • Lymphocytes Relative 10/29/2021 41  14 - 44 % Final   • Monocytes Relative 10/29/2021 11  4 - 12 % Final   • Eosinophils Relative 10/29/2021 2  0 - 6 % Final   • Basophils Relative 10/29/2021 1  0 - 1 % Final   • Neutrophils Absolute 10/29/2021 2.94  1.85 - 7.62 Thousands/µL Final   • Immature Grans Absolute 10/29/2021 0.02  0.00 - 0.20 Thousand/uL Final   • Lymphocytes Absolute 10/29/2021 2.68  0.60 - 4.47 Thousands/µL Final   • Monocytes Absolute 10/29/2021 0.69  0.17 - 1.22 Thousand/µL Final   • Eosinophils Absolute 10/29/2021 0.11  0.00 - 0.61 Thousand/µL Final   • Basophils Absolute 10/29/2021 0.04  0.00 - 0.10 Thousands/µL Final   • Hepatitis C Ab 10/29/2021 Non-reactive  Non-reactive Final

## 2024-01-26 NOTE — LETTER
January 26, 2024     Patient: Iam Kirby  YOB: 1970  Date of Visit: 1/26/2024      To Whom it May Concern:    Iam Kirby is under my professional care. Iam was seen in my office on 1/26/2024. Iam may return to work on 01/29/2024. Please excuse Iam for 01/22/20024 .    If you have any questions or concerns, please don't hesitate to call.         Sincerely,          Gabriela Stephenson DO        CC: No Recipients

## 2025-03-10 ENCOUNTER — OFFICE VISIT (OUTPATIENT)
Dept: INTERNAL MEDICINE CLINIC | Facility: OTHER | Age: 55
End: 2025-03-10
Payer: COMMERCIAL

## 2025-03-10 VITALS
TEMPERATURE: 99 F | HEART RATE: 72 BPM | SYSTOLIC BLOOD PRESSURE: 132 MMHG | BODY MASS INDEX: 25.99 KG/M2 | HEIGHT: 75 IN | OXYGEN SATURATION: 96 % | WEIGHT: 209 LBS | DIASTOLIC BLOOD PRESSURE: 86 MMHG

## 2025-03-10 DIAGNOSIS — R68.89 FLU-LIKE SYMPTOMS: ICD-10-CM

## 2025-03-10 DIAGNOSIS — J06.9 ACUTE URI: Primary | ICD-10-CM

## 2025-03-10 LAB
SARS-COV-2 AG UPPER RESP QL IA: NEGATIVE
SL AMB POCT RAPID FLU A: NEGATIVE
SL AMB POCT RAPID FLU B: NEGATIVE
VALID CONTROL: NORMAL

## 2025-03-10 PROCEDURE — 99213 OFFICE O/P EST LOW 20 MIN: CPT

## 2025-03-10 PROCEDURE — 87811 SARS-COV-2 COVID19 W/OPTIC: CPT

## 2025-03-10 PROCEDURE — 87804 INFLUENZA ASSAY W/OPTIC: CPT

## 2025-03-10 RX ORDER — BENZONATATE 200 MG/1
200 CAPSULE ORAL 3 TIMES DAILY PRN
Qty: 20 CAPSULE | Refills: 0 | Status: SHIPPED | OUTPATIENT
Start: 2025-03-10

## 2025-03-10 NOTE — PATIENT INSTRUCTIONS
Tessalon perles as needed for cough    Advised Flonase twice daily, second-generation antihistamine such as Zyrtec daily, decongestant daily  Advised increased rest, fluid intake  Discussed symptomatic care including salt water gargles for sore throat, steam showers for congestion, warm liquids   Patient can take Tylenol/ibuprofen for fevers and body aches  Discussed red flag symptoms including going to the ER with chest pain or shortness of breath  Discussed course of illness could be 1 to 2 weeks.   Return to the office for reevaluation if symptoms do not improve in 1-2 weeks

## 2025-03-10 NOTE — PROGRESS NOTES
Name: Iam Kirby      : 1970      MRN: [id number]  Encounter Provider: Kirstie Sevilla PA-C  Encounter Date: 3/10/2025   Encounter department: [facility]  :  Assessment & Plan  Acute URI  URI symptoms x 6 days  Symptoms likely viral in nature, appear to be improving per patient  Tessalon perles as needed for cough  Advised Flonase twice daily, second-generation antihistamine such as Zyrtec daily, decongestant daily (avoid pseudoephedrine due to borderline high blood pressure)  Advised increased rest, fluid intake  Discussed symptomatic care including salt water gargles for sore throat, steam showers for congestion, warm liquids   Patient can take Tylenol/ibuprofen for fevers and body aches  Discussed red flag symptoms including going to the ER with chest pain or shortness of breath  Discussed course of illness could be 1 to 2 weeks.   Return to the office for reevaluation if symptoms do not improve in 1-2 weeks     Orders:    benzonatate (TESSALON) 200 MG capsule; Take 1 capsule (200 mg total) by mouth 3 (three) times a day as needed for cough    Flu-like symptoms    Orders:    POCT rapid flu A and B    Poct Covid 19 Rapid Antigen Test          Tobacco Cessation Counseling: Tobacco cessation counseling was provided. The patient is sincerely urged to quit consumption of tobacco. He is not ready to quit tobacco.       History of Present Illness   Patient is a 55-year-old male presenting to the office for URI symptoms x 6 days  He is endorsing productive cough, fatigue  Denies fevers, chills or body aches  Denies shortness of breath or wheezing  Patient reports that his symptoms appear to be improving    Patient is a smoker, denies hx of COPD or asthma     Tx tried: zinc, D3, DayQuil  Moderate improvement with OTC therapy    URI   This is a new problem. The current episode started in the past 7 days. The problem has been gradually improving. There has been no fever.  "Associated symptoms include congestion, coughing and rhinorrhea. Pertinent negatives include no abdominal pain, chest pain, diarrhea, ear pain, headaches, nausea, plugged ear sensation, sinus pain, sore throat, vomiting or wheezing. He has tried decongestant for the symptoms. The treatment provided moderate relief.         Review of Systems   Constitutional:  Positive for fatigue. Negative for chills and fever.   HENT:  Positive for congestion, postnasal drip and rhinorrhea. Negative for ear pain, sinus pressure, sinus pain, sore throat and trouble swallowing.    Eyes:  Negative for discharge and redness.   Respiratory:  Positive for cough. Negative for chest tightness, shortness of breath and wheezing.    Cardiovascular:  Negative for chest pain and palpitations.   Gastrointestinal:  Negative for abdominal pain, constipation, diarrhea, nausea and vomiting.   Musculoskeletal:  Negative for myalgias.   Neurological:  Negative for dizziness, light-headedness and headaches.       Objective   /86 (Patient Position: Sitting, Cuff Size: Adult)   Pulse 72   Temp 99 °F (37.2 °C) (Temporal)   Ht 6' 3\" (1.905 m)   Wt 94.8 kg (209 lb)   SpO2 96%   BMI 26.12 kg/m²      Physical Exam  Vitals and nursing note reviewed.   Constitutional:       General: He is not in acute distress.     Appearance: Normal appearance. He is not ill-appearing.   HENT:      Head: Normocephalic and atraumatic.      Right Ear: Tympanic membrane, ear canal and external ear normal. No middle ear effusion. There is impacted cerumen (not occluding TM). Tympanic membrane is not erythematous.      Left Ear: Tympanic membrane, ear canal and external ear normal.  No middle ear effusion. Tympanic membrane is not erythematous.      Nose: Nose normal. No congestion or rhinorrhea.      Right Sinus: No maxillary sinus tenderness or frontal sinus tenderness.      Left Sinus: No maxillary sinus tenderness or frontal sinus tenderness.      Mouth/Throat:      " Mouth: Mucous membranes are moist.      Pharynx: Oropharynx is clear. Uvula midline. No oropharyngeal exudate, posterior oropharyngeal erythema or postnasal drip.      Tonsils: No tonsillar exudate.   Eyes:      General: No scleral icterus.        Right eye: No discharge.         Left eye: No discharge.      Conjunctiva/sclera: Conjunctivae normal.      Pupils: Pupils are equal, round, and reactive to light.   Cardiovascular:      Rate and Rhythm: Normal rate and regular rhythm.      Heart sounds: Normal heart sounds. No murmur heard.     No friction rub. No gallop.   Pulmonary:      Effort: Pulmonary effort is normal. No respiratory distress.      Breath sounds: Normal breath sounds. No wheezing, rhonchi or rales.   Chest:      Chest wall: No tenderness.   Musculoskeletal:      Cervical back: No tenderness.      Right lower leg: No edema.      Left lower leg: No edema.   Lymphadenopathy:      Cervical: Cervical adenopathy present.      Right cervical: Superficial cervical adenopathy present.   Skin:     General: Skin is warm and dry.      Coloration: Skin is not pale.      Findings: No erythema.   Neurological:      General: No focal deficit present.      Mental Status: He is alert and oriented to person, place, and time. Mental status is at baseline.   Psychiatric:         Mood and Affect: Mood normal.         Behavior: Behavior normal.           Disclaimer: This note was generated with voice recognition software.  Phonetic, grammatical, and spelling errors may be present as a result.  Please contact provider with any concerns or questions

## 2025-05-02 ENCOUNTER — OFFICE VISIT (OUTPATIENT)
Dept: INTERNAL MEDICINE CLINIC | Age: 55
End: 2025-05-02
Payer: COMMERCIAL

## 2025-05-02 VITALS
TEMPERATURE: 97.4 F | SYSTOLIC BLOOD PRESSURE: 126 MMHG | WEIGHT: 206 LBS | OXYGEN SATURATION: 99 % | HEART RATE: 93 BPM | HEIGHT: 75 IN | BODY MASS INDEX: 25.61 KG/M2 | DIASTOLIC BLOOD PRESSURE: 84 MMHG

## 2025-05-02 DIAGNOSIS — L03.213 PERIORBITAL CELLULITIS OF LEFT EYE: Primary | ICD-10-CM

## 2025-05-02 PROCEDURE — 99213 OFFICE O/P EST LOW 20 MIN: CPT

## 2025-05-02 NOTE — LETTER
May 2, 2025     Patient: Iam Kirby  YOB: 1970  Date of Visit: 5/2/2025      To Whom it May Concern:    Iam Kirby is under my professional care. Iam was seen in my office on 5/2/2025. Iam may return to work on 5/8/25 .    If you have any questions or concerns, please don't hesitate to call.         Sincerely,          Kirstie Sevilla PA-C        CC: No Recipients

## 2025-05-02 NOTE — PROGRESS NOTES
Name: Iam Kirby      : 1970      MRN: [id number]  Encounter Provider: Kirstie Sevilla PA-C  Encounter Date: 2025   Encounter department: [facility]  :  Assessment & Plan  Periorbital cellulitis of left eye  Will treat with Augmentin twice daily x 7 days.  Take with food to prevent GI upset  Encouraged warm compresses, light massage to the area  Red flag symptoms discussed to proceed to ER.  If any changes to vision, proceed to ER or ophthalmology ASAP  Follow-up 1 week  Orders:    amoxicillin-clavulanate (AUGMENTIN) 875-125 mg per tablet; Take 1 tablet by mouth every 12 (twelve) hours for 7 days           History of Present Illness   Patient is a 55-year-old male presenting to the office for concerns of eye swelling x 1 week  This is patient's third episode of similar symptoms  Reports it started as a small bump, has progressively gotten worse  Denies known trauma or or exposure to conjunctivitis  Swelling is at the worst in the morning, improves throughout the day   Patient notes he is a , he is unable to perform work duties with this eyelid swelling    Tx tried: none       Eye Problem   The left eye is affected. This is a new problem. The current episode started 1 to 4 weeks ago. The problem has been gradually worsening. There was no injury mechanism. The patient is experiencing no pain. There is No known exposure to pink eye. He Does not wear contacts. Pertinent negatives include no blurred vision, eye discharge, double vision, eye redness, fever, foreign body sensation, itching, nausea, photophobia, recent URI or vomiting. He has tried nothing for the symptoms.           Review of Systems   Constitutional:  Negative for chills, fatigue and fever.   HENT:  Negative for congestion, ear discharge, ear pain, postnasal drip, rhinorrhea, sinus pressure, sinus pain, sore throat and trouble swallowing.    Eyes:  Negative for blurred vision, double vision,  "photophobia, pain, discharge, redness, itching and visual disturbance.        Eye swelling     Respiratory:  Negative for cough, shortness of breath and wheezing.    Cardiovascular:  Negative for chest pain and palpitations.   Gastrointestinal:  Negative for abdominal pain, constipation, diarrhea, nausea and vomiting.   Neurological:  Negative for dizziness, light-headedness and headaches.       Objective   /84 (BP Location: Left arm, Patient Position: Sitting, Cuff Size: Standard)   Pulse 93   Temp (!) 97.4 °F (36.3 °C) (Temporal)   Ht 6' 3\" (1.905 m)   Wt 93.4 kg (206 lb)   SpO2 99%   BMI 25.75 kg/m²      Physical Exam  Vitals and nursing note reviewed.   Constitutional:       General: He is not in acute distress.     Appearance: Normal appearance. He is not ill-appearing.   HENT:      Head: Normocephalic and atraumatic.      Right Ear: External ear normal.      Left Ear: External ear normal.      Nose: Nose normal.      Mouth/Throat:      Mouth: Mucous membranes are moist.      Pharynx: Oropharynx is clear.   Eyes:      General: No scleral icterus.        Right eye: No discharge.         Left eye: No discharge.      Extraocular Movements: Extraocular movements intact.      Conjunctiva/sclera: Conjunctivae normal.      Pupils: Pupils are equal, round, and reactive to light.      Comments: See picture. No TTP, no active drainage   Cardiovascular:      Rate and Rhythm: Normal rate and regular rhythm.      Heart sounds: Normal heart sounds. No murmur heard.     No friction rub. No gallop.   Pulmonary:      Effort: Pulmonary effort is normal. No respiratory distress.      Breath sounds: Normal breath sounds. No wheezing, rhonchi or rales.   Chest:      Chest wall: No tenderness.   Lymphadenopathy:      Cervical: No cervical adenopathy.   Skin:     General: Skin is warm and dry.      Findings: Erythema present.   Neurological:      General: No focal deficit present.      Mental Status: He is alert and " oriented to person, place, and time. Mental status is at baseline.   Psychiatric:         Mood and Affect: Mood normal.         Behavior: Behavior normal.               Disclaimer: This note was generated with voice recognition software.  Phonetic, grammatical, and spelling errors may be present as a result.  Please contact provider with any concerns or questions

## 2025-05-05 ENCOUNTER — RA CDI HCC (OUTPATIENT)
Dept: OTHER | Facility: HOSPITAL | Age: 55
End: 2025-05-05

## 2025-05-06 ENCOUNTER — OFFICE VISIT (OUTPATIENT)
Dept: INTERNAL MEDICINE CLINIC | Age: 55
End: 2025-05-06
Payer: COMMERCIAL

## 2025-05-06 VITALS
HEART RATE: 72 BPM | BODY MASS INDEX: 25.36 KG/M2 | HEIGHT: 75 IN | DIASTOLIC BLOOD PRESSURE: 70 MMHG | WEIGHT: 204 LBS | TEMPERATURE: 97.5 F | SYSTOLIC BLOOD PRESSURE: 122 MMHG | OXYGEN SATURATION: 98 %

## 2025-05-06 DIAGNOSIS — H00.039 EYELID CELLULITIS: Primary | ICD-10-CM

## 2025-05-06 DIAGNOSIS — Z00.00 ANNUAL PHYSICAL EXAM: ICD-10-CM

## 2025-05-06 DIAGNOSIS — F17.210 SMOKING GREATER THAN 20 PACK YEARS: ICD-10-CM

## 2025-05-06 DIAGNOSIS — Z12.11 SCREENING FOR COLON CANCER: ICD-10-CM

## 2025-05-06 DIAGNOSIS — Z11.4 SCREENING FOR HIV (HUMAN IMMUNODEFICIENCY VIRUS): ICD-10-CM

## 2025-05-06 DIAGNOSIS — Z11.59 NEED FOR HEPATITIS C SCREENING TEST: ICD-10-CM

## 2025-05-06 DIAGNOSIS — Z12.11 SCREENING FOR MALIGNANT NEOPLASM OF COLON: ICD-10-CM

## 2025-05-06 PROCEDURE — 99213 OFFICE O/P EST LOW 20 MIN: CPT | Performed by: INTERNAL MEDICINE

## 2025-05-06 PROCEDURE — 99396 PREV VISIT EST AGE 40-64: CPT | Performed by: INTERNAL MEDICINE

## 2025-05-06 NOTE — PATIENT INSTRUCTIONS
"Patient Education     Routine physical for adults   The Basics   Written by the doctors and editors at [facility]   What is a physical? -- A physical is a routine visit, or \"check-up,\" with your doctor. You might also hear it called a \"wellness visit\" or \"preventive visit.\"  During each visit, the doctor will:   Ask about your physical and mental health   Ask about your habits, behaviors, and lifestyle   Do an exam   Give you vaccines if needed   Talk to you about any medicines you take   Give advice about your health   Answer your questions  Getting regular check-ups is an important part of taking care of your health. It can help your doctor find and treat any problems you have. But it's also important for preventing health problems.  A routine physical is different from a \"sick visit.\" A sick visit is when you see a doctor because of a health concern or problem. Since physicals are scheduled ahead of time, you can think about what you want to ask the doctor.  How often should I get a physical? -- It depends on your age and health. In general, for people age 21 years and older:   If you are younger than 50 years, you might be able to get a physical every 3 years.   If you are 50 years or older, your doctor might recommend a physical every year.  If you have an ongoing health condition, like diabetes or high blood pressure, your doctor will probably want to see you more often.  What happens during a physical? -- In general, each visit will include:   Physical exam - The doctor or nurse will check your height, weight, heart rate, and blood pressure. They will also look at your eyes and ears. They will ask about how you are feeling and whether you have any symptoms that bother you.   Medicines - It's a good idea to bring a list of all the medicines you take to each doctor visit. Your doctor will talk to you about your medicines and answer any questions. Tell them if you are having any side effects that bother you. You " "should also tell them if you are having trouble paying for any of your medicines.   Habits and behaviors - This includes:   Your diet   Your exercise habits   Whether you smoke, drink alcohol, or use drugs   Whether you are sexually active   Whether you feel safe at home  Your doctor will talk to you about things you can do to improve your health and lower your risk of health problems. They will also offer help and support. For example, if you want to quit smoking, they can give you advice and might prescribe medicines. If you want to improve your diet or get more physical activity, they can help you with this, too.   Lab tests, if needed - The tests you get will depend on your age and situation. For example, your doctor might want to check your:   Cholesterol   Blood sugar   Iron level   Vaccines - The recommended vaccines will depend on your age, health, and what vaccines you already had. Vaccines are very important because they can prevent certain serious or deadly infections.   Discussion of screening - \"Screening\" means checking for diseases or other health problems before they cause symptoms. Your doctor can recommend screening based on your age, risk, and preferences. This might include tests to check for:   Cancer, such as breast, prostate, cervical, ovarian, colorectal, prostate, lung, or skin cancer   Sexually transmitted infections, such as chlamydia and gonorrhea   Mental health conditions like depression and anxiety  Your doctor will talk to you about the different types of screening tests. They can help you decide which screenings to have. They can also explain what the results might mean.   Answering questions - The physical is a good time to ask the doctor or nurse questions about your health. If needed, they can refer you to other doctors or specialists, too.  Adults older than 65 years often need other care, too. As you get older, your doctor will talk to you about:   How to prevent falling at " home   Hearing or vision tests   Memory testing   How to take your medicines safely   Making sure that you have the help and support you need at home  All topics are updated as new evidence becomes available and our peer review process is complete.  This topic retrieved from [employer] on: May 02, 2024.  Topic 388150 Version 1.0  Release: 32.4.3 - C32.122  © 2024 UpToDate, Inc. and/or its affiliates. All rights reserved.  Consumer Information Use and Disclaimer   Disclaimer: This generalized information is a limited summary of diagnosis, treatment, and/or medication information. It is not meant to be comprehensive and should be used as a tool to help the user understand and/or assess potential diagnostic and treatment options. It does NOT include all information about conditions, treatments, medications, side effects, or risks that may apply to a specific patient. It is not intended to be medical advice or a substitute for the medical advice, diagnosis, or treatment of a health care provider based on the health care provider's examination and assessment of a patient's specific and unique circumstances. Patients must speak with a health care provider for complete information about their health, medical questions, and treatment options, including any risks or benefits regarding use of medications. This information does not endorse any treatments or medications as safe, effective, or approved for treating a specific patient. UpToDate, Inc. and its affiliates disclaim any warranty or liability relating to this information or the use thereof.The use of this information is governed by the Terms of Use, available at https://www.[employer].uwer.com/en/know/clinical-effectiveness-terms. 2024© UpToDate, Inc. and its affiliates and/or licensors. All rights reserved.  Copyright   © 2024 UpToDate, Inc. and/or its affiliates. All rights reserved.

## 2025-05-06 NOTE — PROGRESS NOTES
Adult Annual Physical  Name: Iam Kirby      : 1970      MRN: [id number]  Encounter Provider: Vicki Vazquez MD  Encounter Date: 2025   Encounter department: [facility] PRIMARY CARE [facility]    :  Assessment & Plan  Smoking greater than 20 pack years    Orders:    CT lung screening program; Future    Screening for malignant neoplasm of colon         Eyelid cellulitis         Screening for colon cancer    Orders:    Cologuard    Annual physical exam    Orders:    UA w Reflex to Microscopic w Reflex to Culture; Future        Preventive Screenings:  - Diabetes Screening: risks/benefits discussed  - Cholesterol Screening: risks/benefits discussed   - Hepatitis C screening: screening up-to-date and risks/benefits discussed   - HIV screening: risks/benefits discussed   - Colon cancer screening: risks/benefits discussed   - Lung cancer screening: risks/benefits discussed   - Prostate cancer screening: risks/benefits discussed     Immunizations:  - Immunizations due: Prevnar 20, Tdap and Zoster (Shingrix)    Counseling/Anticipatory Guidance:  - Alcohol: discussed moderation in alcohol intake and recommendations for healthy alcohol use.   - Drug use: discussed harms of illicit drug use and how it can negatively impact mental/physical health.   - Tobacco use: discussed harms of tobacco use and management options for quitting.   - Dental health: discussed importance of regular tooth brushing, flossing, and dental visits.   - Sexual health: discussed sexually transmitted diseases, partner selection, use of condoms, avoidance of unintended pregnancy, and contraceptive alternatives.   - Diet: discussed recommendations for a healthy/well-balanced diet.   - Exercise: the importance of regular exercise/physical activity was discussed. Recommend exercise 3-5 times per week for at least 30 minutes.   - Injury prevention: discussed safety/seat belts, safety helmets, smoke detectors, carbon monoxide  "detectors, and smoking near bedding or upholstery.          History of Present Illness     Adult Annual Physical:  Patient presents for annual physical.     Diet and Physical Activity:  - Diet/Nutrition: well balanced diet and consuming 3-5 servings of fruits/vegetables daily.  - Exercise: no formal exercise.    General Health:  - Sleep: sleeps well and 7-8 hours of sleep on average.  - Vision: wears glasses.  - Dental: regular dental visits and no dental visits for > 1 year.     Health:  - History of STDs: no.   - Urinary symptoms: none.     Advanced Care Planning:  - Has an advanced directive?: no    - Has a durable medical POA?: no    - ACP document given to patient?: no      Review of Systems   Constitutional:  Negative for appetite change, fatigue and fever.   HENT:  Negative for congestion, ear pain, hearing loss, nosebleeds, sneezing, tinnitus and voice change.    Eyes:  Negative for pain, discharge and redness.   Respiratory:  Negative for cough, chest tightness and wheezing.    Cardiovascular:  Negative for chest pain, palpitations and leg swelling.   Gastrointestinal:  Negative for abdominal pain, blood in stool, constipation, diarrhea, nausea and vomiting.   Genitourinary:  Negative for difficulty urinating, dysuria, hematuria and urgency.   Musculoskeletal:  Negative for arthralgias, back pain, gait problem and joint swelling.   Skin:  Negative for rash and wound.   Allergic/Immunologic: Negative for environmental allergies.   Neurological:  Negative for dizziness, tremors, seizures, weakness, light-headedness and numbness.   Hematological:  Negative for adenopathy. Does not bruise/bleed easily.   Psychiatric/Behavioral:  Negative for behavioral problems and confusion. The patient is not nervous/anxious.          Objective   /70 (BP Location: Left arm, Patient Position: Sitting, Cuff Size: Large)   Pulse 72   Temp 97.5 °F (36.4 °C) (Temporal)   Ht 6' 3\" (1.905 m)   Wt 92.5 kg (204 lb)   SpO2 " 98%   BMI 25.50 kg/m²     Physical Exam  Vitals and nursing note reviewed.   Constitutional:       Appearance: Normal appearance. He is normal weight.   HENT:      Head: Normocephalic and atraumatic.      Right Ear: Tympanic membrane normal.      Left Ear: Tympanic membrane normal.      Nose: Nose normal.      Mouth/Throat:      Mouth: Mucous membranes are moist.   Eyes:      Extraocular Movements: Extraocular movements intact.      Pupils: Pupils are equal, round, and reactive to light.   Cardiovascular:      Rate and Rhythm: Normal rate and regular rhythm.      Pulses: Normal pulses.      Heart sounds: Normal heart sounds.   Pulmonary:      Effort: Pulmonary effort is normal.      Breath sounds: Normal breath sounds.   Abdominal:      General: Abdomen is flat. Bowel sounds are normal.      Palpations: Abdomen is soft.   Musculoskeletal:         General: No swelling, tenderness or deformity. Normal range of motion.      Cervical back: Normal range of motion and neck supple.   Skin:     General: Skin is warm.      Capillary Refill: Capillary refill takes 2 to 3 seconds.   Neurological:      General: No focal deficit present.      Mental Status: He is alert and oriented to person, place, and time. Mental status is at baseline.   Psychiatric:         Mood and Affect: Mood normal.         Behavior: Behavior normal.